# Patient Record
Sex: MALE | Race: WHITE | NOT HISPANIC OR LATINO | Employment: FULL TIME | URBAN - METROPOLITAN AREA
[De-identification: names, ages, dates, MRNs, and addresses within clinical notes are randomized per-mention and may not be internally consistent; named-entity substitution may affect disease eponyms.]

---

## 2017-09-05 ENCOUNTER — OFFICE VISIT (OUTPATIENT)
Dept: CARDIOLOGY | Facility: MEDICAL CENTER | Age: 32
End: 2017-09-05

## 2017-09-05 VITALS
OXYGEN SATURATION: 96 % | BODY MASS INDEX: 31.01 KG/M2 | DIASTOLIC BLOOD PRESSURE: 98 MMHG | WEIGHT: 175 LBS | SYSTOLIC BLOOD PRESSURE: 156 MMHG | HEIGHT: 63 IN | HEART RATE: 94 BPM

## 2017-09-05 DIAGNOSIS — I10 ESSENTIAL HYPERTENSION: ICD-10-CM

## 2017-09-05 DIAGNOSIS — I48.91 ATRIAL FIBRILLATION, UNSPECIFIED TYPE (HCC): ICD-10-CM

## 2017-09-05 LAB — EKG IMPRESSION: NORMAL

## 2017-09-05 PROCEDURE — 99214 OFFICE O/P EST MOD 30 MIN: CPT | Performed by: INTERNAL MEDICINE

## 2017-09-05 PROCEDURE — 93000 ELECTROCARDIOGRAM COMPLETE: CPT | Performed by: INTERNAL MEDICINE

## 2017-09-05 RX ORDER — CARVEDILOL 3.12 MG/1
3.12 TABLET ORAL 2 TIMES DAILY WITH MEALS
Qty: 60 TAB | Refills: 5 | Status: SHIPPED | OUTPATIENT
Start: 2017-09-05 | End: 2018-01-09

## 2017-09-05 RX ORDER — LISINOPRIL 10 MG/1
20 TABLET ORAL DAILY
Qty: 30 TAB | Refills: 5 | Status: ON HOLD | OUTPATIENT
Start: 2017-09-05 | End: 2018-01-11

## 2017-09-05 ASSESSMENT — ENCOUNTER SYMPTOMS
SHORTNESS OF BREATH: 1
PALPITATIONS: 1

## 2017-09-05 NOTE — PROGRESS NOTES
"Subjective:   Elian Sanchez is a 32 y.o. male who presents today for follow up of a fib    Still getting fast hr.  Sob is tied to fast hr.    Lots of a fib.  Happens just about every day.    Pressure in head.  Feels like it is going to explode. Not sure if it is related to fast rhythm.   Gets cramping.  bp consistently elevated.      Past Medical History:   Diagnosis Date   • Breath shortness    • Cardiac rhythm disturbance    • Hemorrhagic disorder (CMS-HCC)     nose bleeds, even before Xarelto   • Hypertension    • Snoring      Past Surgical History:   Procedure Laterality Date   • RECOVERY  1/20/2016    Procedure: CATH LAB A-FIB ABLATION LRG GRP CARTO MARLENY ;  Surgeon: Recoveryonly Surgery;  Location: SURGERY PRE-POST PROC UNIT [facility] [place];  Service:    • OTHER ORTHOPEDIC SURGERY  1993    Pin place to hold joint in place.      History reviewed. No pertinent family history.  History   Smoking Status   • Never Smoker   Smokeless Tobacco   • Never Used     No Known Allergies  Outpatient Encounter Prescriptions as of 9/5/2017   Medication Sig Dispense Refill   • carvedilol (COREG) 3.125 MG Tab Take 1 Tab by mouth 2 times a day, with meals. 60 Tab 5   • lisinopril (PRINIVIL) 10 MG Tab Take 2 Tabs by mouth every day. 30 Tab 5     No facility-administered encounter medications on file as of 9/5/2017.      Review of Systems   Respiratory: Positive for shortness of breath.    Cardiovascular: Positive for palpitations.   Neurological: Positive for headaches.        Objective:   /98   Pulse 94   Ht 1.6 m (5' 3\")   Wt 79.4 kg (175 lb)   SpO2 96%   BMI 31.00 kg/m²     Physical Exam   Constitutional: He is oriented to person, place, and time. He appears well-developed and well-nourished. No distress.   HENT:   Head: Normocephalic and atraumatic.   Right Ear: External ear normal.   Left Ear: External ear normal.   Nose: Nose normal.   Mouth/Throat: Oropharynx is clear and moist.   Eyes: Conjunctivae and EOM are normal. " Pupils are equal, round, and reactive to light. Right eye exhibits no discharge. Left eye exhibits no discharge. No scleral icterus.   Neck: Normal range of motion. Neck supple. No JVD present. No tracheal deviation present.   Cardiovascular: Normal rate, regular rhythm, normal heart sounds and intact distal pulses.  Exam reveals no gallop and no friction rub.    No murmur heard.  Pulmonary/Chest: Effort normal and breath sounds normal. No stridor. No respiratory distress. He has no wheezes. He has no rales. He exhibits no tenderness.   Abdominal: Soft. He exhibits no distension. There is no tenderness.   Musculoskeletal: He exhibits no edema or tenderness.   Neurological: He is alert and oriented to person, place, and time. No cranial nerve deficit. Coordination normal.   Skin: Skin is warm and dry. No rash noted. He is not diaphoretic. No erythema. No pallor.   Psychiatric: He has a normal mood and affect. His behavior is normal. Judgment and thought content normal.   Vitals reviewed.      Assessment:     1. Atrial fibrillation, unspecified type (CMS-HCC)  EKG    HOLTER MONITOR STUDY   2. Essential hypertension  URINALYSIS    COMP METABOLIC PANEL    THYROID PANEL WITH TSH       Medical Decision Making:  Today's Assessment / Status / Plan:   A fib- get monitor for recurrence.  Consider repeat pvi because had initial excellent response  htn- family history but evaluate for secondary htn.  If bp stays elevated would start chlorthalidone and low dose b blocker

## 2017-10-03 ASSESSMENT — ENCOUNTER SYMPTOMS: HEADACHES: 1

## 2017-10-26 ENCOUNTER — TELEPHONE (OUTPATIENT)
Dept: CARDIOLOGY | Facility: MEDICAL CENTER | Age: 32
End: 2017-10-26

## 2017-10-26 NOTE — TELEPHONE ENCOUNTER
L/M patient to have labs done ordered at prior appt. Please call back if done with where it was done-am

## 2017-11-07 ENCOUNTER — HOSPITAL ENCOUNTER (EMERGENCY)
Facility: MEDICAL CENTER | Age: 32
End: 2017-11-07
Attending: EMERGENCY MEDICINE
Payer: MEDICAID

## 2017-11-07 ENCOUNTER — APPOINTMENT (OUTPATIENT)
Dept: RADIOLOGY | Facility: MEDICAL CENTER | Age: 32
End: 2017-11-07
Attending: EMERGENCY MEDICINE
Payer: MEDICAID

## 2017-11-07 VITALS
SYSTOLIC BLOOD PRESSURE: 122 MMHG | TEMPERATURE: 98 F | WEIGHT: 184 LBS | OXYGEN SATURATION: 96 % | HEIGHT: 65 IN | HEART RATE: 60 BPM | RESPIRATION RATE: 18 BRPM | BODY MASS INDEX: 30.66 KG/M2 | DIASTOLIC BLOOD PRESSURE: 84 MMHG

## 2017-11-07 DIAGNOSIS — R19.7 VOMITING AND DIARRHEA: ICD-10-CM

## 2017-11-07 DIAGNOSIS — R11.10 VOMITING AND DIARRHEA: ICD-10-CM

## 2017-11-07 LAB
ALBUMIN SERPL BCP-MCNC: 4.6 G/DL (ref 3.2–4.9)
ALBUMIN/GLOB SERPL: 1.4 G/DL
ALP SERPL-CCNC: 76 U/L (ref 30–99)
ALT SERPL-CCNC: 19 U/L (ref 2–50)
ANION GAP SERPL CALC-SCNC: 7 MMOL/L (ref 0–11.9)
APTT PPP: 29.3 SEC (ref 24.7–36)
AST SERPL-CCNC: 17 U/L (ref 12–45)
BASOPHILS # BLD AUTO: 0.6 % (ref 0–1.8)
BASOPHILS # BLD: 0.06 K/UL (ref 0–0.12)
BILIRUB SERPL-MCNC: 0.7 MG/DL (ref 0.1–1.5)
BNP SERPL-MCNC: 15 PG/ML (ref 0–100)
BUN SERPL-MCNC: 12 MG/DL (ref 8–22)
CALCIUM SERPL-MCNC: 9.6 MG/DL (ref 8.5–10.5)
CHLORIDE SERPL-SCNC: 106 MMOL/L (ref 96–112)
CO2 SERPL-SCNC: 24 MMOL/L (ref 20–33)
CREAT SERPL-MCNC: 0.89 MG/DL (ref 0.5–1.4)
EKG IMPRESSION: NORMAL
EOSINOPHIL # BLD AUTO: 0.27 K/UL (ref 0–0.51)
EOSINOPHIL NFR BLD: 2.9 % (ref 0–6.9)
ERYTHROCYTE [DISTWIDTH] IN BLOOD BY AUTOMATED COUNT: 37.2 FL (ref 35.9–50)
GFR SERPL CREATININE-BSD FRML MDRD: >60 ML/MIN/1.73 M 2
GLOBULIN SER CALC-MCNC: 3.4 G/DL (ref 1.9–3.5)
GLUCOSE SERPL-MCNC: 98 MG/DL (ref 65–99)
HCT VFR BLD AUTO: 50 % (ref 42–52)
HGB BLD-MCNC: 17.4 G/DL (ref 14–18)
IMM GRANULOCYTES # BLD AUTO: 0.07 K/UL (ref 0–0.11)
IMM GRANULOCYTES NFR BLD AUTO: 0.8 % (ref 0–0.9)
INR PPP: 0.95 (ref 0.87–1.13)
LIPASE SERPL-CCNC: 61 U/L (ref 11–82)
LYMPHOCYTES # BLD AUTO: 1.88 K/UL (ref 1–4.8)
LYMPHOCYTES NFR BLD: 20.2 % (ref 22–41)
MAGNESIUM SERPL-MCNC: 2 MG/DL (ref 1.5–2.5)
MCH RBC QN AUTO: 28.2 PG (ref 27–33)
MCHC RBC AUTO-ENTMCNC: 34.8 G/DL (ref 33.7–35.3)
MCV RBC AUTO: 80.9 FL (ref 81.4–97.8)
MONOCYTES # BLD AUTO: 0.48 K/UL (ref 0–0.85)
MONOCYTES NFR BLD AUTO: 5.1 % (ref 0–13.4)
NEUTROPHILS # BLD AUTO: 6.57 K/UL (ref 1.82–7.42)
NEUTROPHILS NFR BLD: 70.4 % (ref 44–72)
NRBC # BLD AUTO: 0 K/UL
NRBC BLD AUTO-RTO: 0 /100 WBC
PLATELET # BLD AUTO: 280 K/UL (ref 164–446)
PMV BLD AUTO: 9.5 FL (ref 9–12.9)
POTASSIUM SERPL-SCNC: 5.2 MMOL/L (ref 3.6–5.5)
PROT SERPL-MCNC: 8 G/DL (ref 6–8.2)
PROTHROMBIN TIME: 12.4 SEC (ref 12–14.6)
RBC # BLD AUTO: 6.18 M/UL (ref 4.7–6.1)
SODIUM SERPL-SCNC: 137 MMOL/L (ref 135–145)
TROPONIN I SERPL-MCNC: <0.01 NG/ML (ref 0–0.04)
TSH SERPL DL<=0.005 MIU/L-ACNC: 2.7 UIU/ML (ref 0.3–3.7)
WBC # BLD AUTO: 9.3 K/UL (ref 4.8–10.8)

## 2017-11-07 PROCEDURE — 93005 ELECTROCARDIOGRAM TRACING: CPT | Performed by: EMERGENCY MEDICINE

## 2017-11-07 PROCEDURE — 99284 EMERGENCY DEPT VISIT MOD MDM: CPT

## 2017-11-07 PROCEDURE — 83690 ASSAY OF LIPASE: CPT

## 2017-11-07 PROCEDURE — 71010 DX-CHEST-LIMITED (1 VIEW): CPT

## 2017-11-07 PROCEDURE — 84443 ASSAY THYROID STIM HORMONE: CPT

## 2017-11-07 PROCEDURE — 96374 THER/PROPH/DIAG INJ IV PUSH: CPT

## 2017-11-07 PROCEDURE — 84484 ASSAY OF TROPONIN QUANT: CPT

## 2017-11-07 PROCEDURE — 85610 PROTHROMBIN TIME: CPT

## 2017-11-07 PROCEDURE — 700111 HCHG RX REV CODE 636 W/ 250 OVERRIDE (IP): Performed by: EMERGENCY MEDICINE

## 2017-11-07 PROCEDURE — 83880 ASSAY OF NATRIURETIC PEPTIDE: CPT

## 2017-11-07 PROCEDURE — 83735 ASSAY OF MAGNESIUM: CPT

## 2017-11-07 PROCEDURE — 93005 ELECTROCARDIOGRAM TRACING: CPT

## 2017-11-07 PROCEDURE — 80053 COMPREHEN METABOLIC PANEL: CPT

## 2017-11-07 PROCEDURE — 85025 COMPLETE CBC W/AUTO DIFF WBC: CPT

## 2017-11-07 PROCEDURE — 85730 THROMBOPLASTIN TIME PARTIAL: CPT

## 2017-11-07 RX ORDER — LORAZEPAM 2 MG/ML
0.5 INJECTION INTRAMUSCULAR ONCE
Status: COMPLETED | OUTPATIENT
Start: 2017-11-07 | End: 2017-11-07

## 2017-11-07 RX ADMIN — LORAZEPAM 0.5 MG: 2 INJECTION INTRAMUSCULAR; INTRAVENOUS at 09:21

## 2017-11-07 ASSESSMENT — PAIN SCALES - GENERAL: PAINLEVEL_OUTOF10: 0

## 2017-11-07 ASSESSMENT — LIFESTYLE VARIABLES: DO YOU DRINK ALCOHOL: NO

## 2017-11-07 NOTE — ED PROVIDER NOTES
ED Provider Note    CHIEF COMPLAINT  Chief Complaint   Patient presents with   • Other     pt has had 2-3 days of sob, weakness and tiredness. pt states he has a hx of red- MD appt tomorrow for abalation consult/labs for kidney fuction. pt states he has not gone to some appts because he doesnt have insurance and cant get in. pt on EMS monitor showed SR to bigeminy 50-60s. .    • N/V     X1 at home and felt better        HPI  Elian Sanchez is a 32 y.o. male who presentsTo the emergency room with weakness, fatigue, nausea, vomiting and diarrhea. He reports a history of atrial fibrillation. He feels like he has atrial fibrillation quite regularly. He's been seen by his cardiologist and is currently on medications for this. He has had an ablation in the past. He has an appointment tomorrow with Dr. Groves. He was told that he needed to have some labs done check his kidney function he states that for at least about last month every single morning he wakes up throws up and has watery diarrhea. He has not seen any blood in the stool. He has on occasion seen blood in his emesis. He denies any associated abdominal pain. Later in the day he will not infrequently have another bowel movement that is of normal consistency. He states that he feels weak and tired. He has not been sleeping well. In the emergency department he feels anxious, but he denies any other symptoms.    REVIEW OF SYSTEMS  As per HPI, otherwise a 10 point review of systems is negative    PAST MEDICAL HISTORY  Past Medical History:   Diagnosis Date   • Breath shortness    • Cardiac rhythm disturbance    • Hemorrhagic disorder (CMS-HCC)     nose bleeds, even before Xarelto   • Hypertension    • Snoring        SOCIAL HISTORY  Social History   Substance Use Topics   • Smoking status: Never Smoker   • Smokeless tobacco: Never Used   • Alcohol use Yes      Comment: occasional       SURGICAL HISTORY  Past Surgical History:   Procedure Laterality Date  "  • RECOVERY  1/20/2016    Procedure: CATH LAB A-FIB ABLATION LRG GRP APARNA MARLENY ;  Surgeon: Recoveryonrichy Surgery;  Location: SURGERY PRE-POST PROC UNIT [facility] [place];  Service:    • OTHER ORTHOPEDIC SURGERY  1993    Pin place to hold joint in place.        CURRENT MEDICATIONS  Home Medications    **Home medications have not yet been reviewed for this encounter**         ALLERGIES  No Known Allergies    PHYSICAL EXAM  VITAL SIGNS: /84   Pulse 64   Temp 36.7 °C (98 °F)   Resp 18   Ht 1.651 m (5' 5\")   Wt 83.5 kg (184 lb)   SpO2 97%   BMI 30.62 kg/m²    Constitutional: Awake and alert  HENT:  Atraumatic, Normocephalic.Oropharynx moist mucus membranes, Nose normal inspection.   Eyes: Normal inspection  Neck: Supple  Cardiovascular: Normal heart rate, Normal rhythm.  Symmetric peripheral pulses.   Thorax & Lungs: No respiratory distress, No wheezing, No rales, No rhonchi, No chest tenderness.   Abdomen: Bowel sounds normal, soft, non-distended, nontender, no mass  Skin: Warm, Dry, No rash.   Back: No tenderness, No CVA tenderness.   Extremities: No clubbing, cyanosis, edema, no Homans or cords   Neurologic: Grossly normal   Psychiatric: Anxious appearing    RADIOLOGY/PROCEDURES  DX-CHEST-LIMITED (1 VIEW)   Final Result      No pulmonary consolidation.        Labs:  Results for orders placed or performed during the hospital encounter of 11/07/17   Troponin   Result Value Ref Range    Troponin I <0.01 0.00 - 0.04 ng/mL   Btype Natriuretic Peptide   Result Value Ref Range    B Natriuretic Peptide 15 0 - 100 pg/mL   CBC with Differential   Result Value Ref Range    WBC 9.3 4.8 - 10.8 K/uL    RBC 6.18 (H) 4.70 - 6.10 M/uL    Hemoglobin 17.4 14.0 - 18.0 g/dL    Hematocrit 50.0 42.0 - 52.0 %    MCV 80.9 (L) 81.4 - 97.8 fL    MCH 28.2 27.0 - 33.0 pg    MCHC 34.8 33.7 - 35.3 g/dL    RDW 37.2 35.9 - 50.0 fL    Platelet Count 280 164 - 446 K/uL    MPV 9.5 9.0 - 12.9 fL    Neutrophils-Polys 70.40 44.00 - 72.00 %    Lymphocytes " 20.20 (L) 22.00 - 41.00 %    Monocytes 5.10 0.00 - 13.40 %    Eosinophils 2.90 0.00 - 6.90 %    Basophils 0.60 0.00 - 1.80 %    Immature Granulocytes 0.80 0.00 - 0.90 %    Nucleated RBC 0.00 /100 WBC    Neutrophils (Absolute) 6.57 1.82 - 7.42 K/uL    Lymphs (Absolute) 1.88 1.00 - 4.80 K/uL    Monos (Absolute) 0.48 0.00 - 0.85 K/uL    Eos (Absolute) 0.27 0.00 - 0.51 K/uL    Baso (Absolute) 0.06 0.00 - 0.12 K/uL    Immature Granulocytes (abs) 0.07 0.00 - 0.11 K/uL    NRBC (Absolute) 0.00 K/uL   Complete Metabolic Panel (CMP)   Result Value Ref Range    Sodium 137 135 - 145 mmol/L    Potassium 5.2 3.6 - 5.5 mmol/L    Chloride 106 96 - 112 mmol/L    Co2 24 20 - 33 mmol/L    Anion Gap 7.0 0.0 - 11.9    Glucose 98 65 - 99 mg/dL    Bun 12 8 - 22 mg/dL    Creatinine 0.89 0.50 - 1.40 mg/dL    Calcium 9.6 8.5 - 10.5 mg/dL    AST(SGOT) 17 12 - 45 U/L    ALT(SGPT) 19 2 - 50 U/L    Alkaline Phosphatase 76 30 - 99 U/L    Total Bilirubin 0.7 0.1 - 1.5 mg/dL    Albumin 4.6 3.2 - 4.9 g/dL    Total Protein 8.0 6.0 - 8.2 g/dL    Globulin 3.4 1.9 - 3.5 g/dL    A-G Ratio 1.4 g/dL   Prothrombin Time   Result Value Ref Range    PT 12.4 12.0 - 14.6 sec    INR 0.95 0.87 - 1.13   APTT   Result Value Ref Range    APTT 29.3 24.7 - 36.0 sec   Lipase   Result Value Ref Range    Lipase 61 11 - 82 U/L   TSH   Result Value Ref Range    TSH 2.700 0.300 - 3.700 uIU/mL   ESTIMATED GFR   Result Value Ref Range    GFR If African American >60 >60 mL/min/1.73 m 2    GFR If Non African American >60 >60 mL/min/1.73 m 2   MAGNESIUM   Result Value Ref Range    Magnesium 2.0 1.5 - 2.5 mg/dL   EKG (ER)   Result Value Ref Range    Report       [facility] Emergency Dept.    Test Date:  2017  Pt Name:    OLESYA TAYLOR                Department: ER  MRN:        [id number]                      Room:       [facility]  Gender:     M                            Technician: [id number]  :        1985                   Requested By:ER TRIAGE  PROTOCOL  Order #:    [id number]                    Reading MD: TAMIA ALMEIDA MD    Measurements  Intervals                                Axis  Rate:       57                           P:          10  MA:         144                          QRS:        72  QRSD:       90                           T:          50  QT:         404  QTc:        394    Interpretive Statements  SINUS ARRHYTHMIA  Compared to ECG 09/05/2017 15:07:57      Electronically Signed On 11-7-2017 10:34:00 PST by TAMIA ALMEIDA MD           COURSE & MEDICAL DECISION MAKING  She presents with persistent chronic vomiting and diarrhea. He has a benign exam. His vital signs were normal. He was very anxious he was given Ativan intravenously in the ER and felt better. I obtained laboratory data which was reassuring. At this point because of his chronic vomiting and diarrhea is not clear. He does not have a surgical process or any emergency condition at this time. He has an appointment with Dr. Groves tomorrow for his palpitations. His EKG shows sinus arrhythmia at this time. Would like the patient to follow up with gastroenterology and I given the number for GI consultants who are on-call today. He is also advised that it would be quite helpful if he has a primary provider. [facility]. He will continue with planned follow-up with Dr. Groves tomorrow. I've advised him to return to the ER for any abdominal pain, fevers, worsening symptoms, not improving or concern.    Patient referred to primary provider for blood pressure management    FINAL IMPRESSION  1. Vomiting and diarrhea, chronic  2. Palpitations  3. Anxiety      This dictation was created using voice recognition software. The accuracy of the dictation is limited to the abilities of the software.  The nursing notes were reviewed and certain aspects of this information were incorporated into this note.      Electronically signed by: Tamia Almeida, 11/7/2017 10:54 AM

## 2017-11-07 NOTE — ED NOTES
Chief Complaint   Patient presents with   • Atrial Fibrillation     pt has had 2-3 days of sob, weakness and tiredness. pt states he has a hx of a-felicity- MD appt tomorrow for abalation consult/labs for kidney fuction. pt states he has not gone to some appts because he doesnt have insurance and cant get in. pt on EMS monitor showed SR to bigeminy 50-60s. .    • N/V     X1 at home and felt better

## 2017-11-07 NOTE — ED NOTES
Pt arrived in room and is changed into gown.  Pt resting comfortably on gurney.   Call light within reach and visible from nurses station.

## 2017-11-07 NOTE — DISCHARGE INSTRUCTIONS
Diet for Irritable Bowel Syndrome  When you have irritable bowel syndrome (IBS), the foods you eat and your eating habits are very important. IBS may cause various symptoms, such as abdominal pain, constipation, or diarrhea. Choosing the right foods can help ease discomfort caused by these symptoms. Work with your health care provider and dietitian to find the best eating plan to help control your symptoms.  WHAT GENERAL GUIDELINES DO I NEED TO FOLLOW?  · Keep a food diary. This will help you identify foods that cause symptoms. Write down:  ¨ What you eat and when.  ¨ What symptoms you have.  ¨ When symptoms occur in relation to your meals.  · Avoid foods that cause symptoms. Talk with your dietitian about other ways to get the same nutrients that are in these foods.  · Eat more foods that contain fiber. Take a fiber supplement if directed by your dietitian.  · Eat your meals slowly, in a relaxed setting.  · Aim to eat 5-6 small meals per day. Do not skip meals.  · Drink enough fluids to keep your urine clear or pale yellow.  · Ask your health care provider if you should take an over-the-counter probiotic during flare-ups to help restore healthy gut bacteria.  · If you have cramping or diarrhea, try making your meals low in fat and high in carbohydrates. Examples of carbohydrates are pasta, rice, whole grain breads and cereals, fruits, and vegetables.  · If dairy products cause your symptoms to flare up, try eating less of them. You might be able to handle yogurt better than other dairy products because it contains bacteria that help with digestion.  WHAT FOODS ARE NOT RECOMMENDED?  The following are some foods and drinks that may worsen your symptoms:  · Fatty foods, such as French fries.  · Milk products, such as cheese or ice cream.  · Chocolate.  · Alcohol.  · Products with caffeine, such as coffee.  · Carbonated drinks, such as soda.  The items listed above may not be a complete list of foods and beverages to  avoid. Contact your dietitian for more information.  WHAT FOODS ARE GOOD SOURCES OF FIBER?  Your health care provider or dietitian may recommend that you eat more foods that contain fiber. Fiber can help reduce constipation and other IBS symptoms. Add foods with fiber to your diet a little at a time so that your body can get used to them. Too much fiber at once might cause gas and swelling of your abdomen. The following are some foods that are good sources of fiber:  · Apples.  · Peaches.  · Pears.  · Berries.  · Figs.  · Broccoli (raw).  · Cabbage.  · Carrots.  · Raw peas.  · Kidney beans.  · Lima beans.  · Whole grain bread.  · Whole grain cereal.  FOR MORE INFORMATION   International Foundation for Functional Gastrointestinal Disorders: www.iffgd.org  National [place] of Diabetes and Digestive and Kidney Diseases: www.niddk.nih.gov/health-information/health-topics/digestive-diseases/ibs/Pages/facts.aspx     This information is not intended to replace advice given to you by your health care provider. Make sure you discuss any questions you have with your health care provider.     Document Released: 03/09/2005 Document Revised: 01/08/2016 Document Reviewed: 03/20/2015  [employer] Interactive Patient Education ©2016 [employer]

## 2017-11-08 ENCOUNTER — OFFICE VISIT (OUTPATIENT)
Dept: CARDIOLOGY | Facility: MEDICAL CENTER | Age: 32
End: 2017-11-08
Payer: MEDICAID

## 2017-11-08 VITALS
HEIGHT: 63 IN | BODY MASS INDEX: 30.83 KG/M2 | SYSTOLIC BLOOD PRESSURE: 122 MMHG | OXYGEN SATURATION: 98 % | DIASTOLIC BLOOD PRESSURE: 74 MMHG | HEART RATE: 78 BPM | WEIGHT: 174 LBS

## 2017-11-08 DIAGNOSIS — I48.0 PAF (PAROXYSMAL ATRIAL FIBRILLATION) (HCC): ICD-10-CM

## 2017-11-08 LAB — EKG IMPRESSION: NORMAL

## 2017-11-08 PROCEDURE — 93000 ELECTROCARDIOGRAM COMPLETE: CPT | Performed by: INTERNAL MEDICINE

## 2017-11-08 PROCEDURE — 99214 OFFICE O/P EST MOD 30 MIN: CPT | Performed by: INTERNAL MEDICINE

## 2017-11-08 RX ORDER — FLECAINIDE ACETATE 100 MG/1
100 TABLET ORAL 2 TIMES DAILY
Qty: 60 TAB | Refills: 11 | Status: ON HOLD | OUTPATIENT
Start: 2017-11-08 | End: 2018-01-11

## 2017-11-09 NOTE — PROGRESS NOTES
"Subjective:   Elian Sanchez is a 32 y.o. male who presents today for follow up of a fib    Gets tired for days when it happens.  Happens frequently enough that it is keeping him from being able to hold down a job    Past Medical History:   Diagnosis Date   • Breath shortness    • Cardiac rhythm disturbance    • Hemorrhagic disorder (CMS-HCC)     nose bleeds, even before Xarelto   • Hypertension    • Snoring      Past Surgical History:   Procedure Laterality Date   • RECOVERY  1/20/2016    Procedure: CATH LAB A-FIB ABLATION LRG KARLIE FARMER ;  Surgeon: Recoveryonly Surgery;  Location: SURGERY PRE-POST PROC UNIT [facility] [place];  Service:    • OTHER ORTHOPEDIC SURGERY  1993    Pin place to hold joint in place.      History reviewed. No pertinent family history.  History   Smoking Status   • Never Smoker   Smokeless Tobacco   • Never Used     No Known Allergies  Outpatient Encounter Prescriptions as of 11/8/2017   Medication Sig Dispense Refill   • flecainide (TAMBOCOR) 100 MG Tab Take 1 Tab by mouth 2 times a day. 60 Tab 11   • carvedilol (COREG) 3.125 MG Tab Take 1 Tab by mouth 2 times a day, with meals. 60 Tab 5   • lisinopril (PRINIVIL) 10 MG Tab Take 2 Tabs by mouth every day. 30 Tab 5     No facility-administered encounter medications on file as of 11/8/2017.      Review of Systems   Constitutional: Positive for malaise/fatigue.   Cardiovascular: Positive for palpitations.   Neurological: Positive for dizziness.        Objective:   /74   Pulse 78   Ht 1.6 m (5' 2.99\")   Wt 78.9 kg (174 lb)   SpO2 98%   BMI 30.83 kg/m²     Physical Exam   Constitutional: He is oriented to person, place, and time. He appears well-developed and well-nourished. No distress.   HENT:   Head: Normocephalic and atraumatic.   Right Ear: External ear normal.   Left Ear: External ear normal.   Nose: Nose normal.   Mouth/Throat: Oropharynx is clear and moist.   Eyes: Conjunctivae and EOM are normal. Pupils are equal, round, and reactive " to light. Right eye exhibits no discharge. Left eye exhibits no discharge. No scleral icterus.   Neck: Normal range of motion. Neck supple. No JVD present. No tracheal deviation present.   Cardiovascular: Normal rate, regular rhythm, normal heart sounds and intact distal pulses.  Exam reveals no gallop and no friction rub.    No murmur heard.  Pulmonary/Chest: Effort normal and breath sounds normal. No stridor. No respiratory distress. He has no wheezes. He has no rales. He exhibits no tenderness.   Abdominal: Soft. He exhibits no distension. There is no tenderness.   Musculoskeletal: He exhibits no edema or tenderness.   Neurological: He is alert and oriented to person, place, and time. No cranial nerve deficit. Coordination normal.   Skin: Skin is warm and dry. No rash noted. He is not diaphoretic. No erythema. No pallor.   Psychiatric: He has a normal mood and affect. His behavior is normal. Judgment and thought content normal.   Vitals reviewed.      Assessment:     1. PAF (paroxysmal atrial fibrillation) (CMS-[facility])  EKG       Medical Decision Making:  Today's Assessment / Status / Plan:   Continue current regimen for now.  Offered repeat eps and ablation to try to get better control but has some financial issues limiting his ability to to that now.  Will follow closely and try to get him in for procedure when he is able.

## 2017-11-19 ASSESSMENT — ENCOUNTER SYMPTOMS
PALPITATIONS: 1
DIZZINESS: 1

## 2017-12-14 ENCOUNTER — TELEPHONE (OUTPATIENT)
Dept: CARDIOLOGY | Facility: MEDICAL CENTER | Age: 32
End: 2017-12-14

## 2017-12-14 NOTE — TELEPHONE ENCOUNTER
Dr. Groves,    This patient is scheduled for an afib ablation on 1-10-18 at [facility] with you. Would you like me to schedule a HUGH for this case? He is not on any anticoagulants. Also, are there any medications you would like this patient to hold for this procedure?    Please advise    Thank You,  Gin

## 2018-01-09 DIAGNOSIS — Z01.812 PRE-OPERATIVE LABORATORY EXAMINATION: ICD-10-CM

## 2018-01-09 DIAGNOSIS — Z01.810 PRE-OPERATIVE CARDIOVASCULAR EXAMINATION: ICD-10-CM

## 2018-01-09 LAB
ALBUMIN SERPL BCP-MCNC: 4.7 G/DL (ref 3.2–4.9)
ALBUMIN/GLOB SERPL: 1.7 G/DL
ALP SERPL-CCNC: 50 U/L (ref 30–99)
ALT SERPL-CCNC: 35 U/L (ref 2–50)
ANION GAP SERPL CALC-SCNC: 6 MMOL/L (ref 0–11.9)
AST SERPL-CCNC: 19 U/L (ref 12–45)
BASOPHILS # BLD AUTO: 0.5 % (ref 0–1.8)
BASOPHILS # BLD: 0.05 K/UL (ref 0–0.12)
BILIRUB SERPL-MCNC: 0.6 MG/DL (ref 0.1–1.5)
BUN SERPL-MCNC: 14 MG/DL (ref 8–22)
CALCIUM SERPL-MCNC: 9.5 MG/DL (ref 8.5–10.5)
CHLORIDE SERPL-SCNC: 107 MMOL/L (ref 96–112)
CO2 SERPL-SCNC: 28 MMOL/L (ref 20–33)
CREAT SERPL-MCNC: 0.96 MG/DL (ref 0.5–1.4)
EOSINOPHIL # BLD AUTO: 0.23 K/UL (ref 0–0.51)
EOSINOPHIL NFR BLD: 2.2 % (ref 0–6.9)
ERYTHROCYTE [DISTWIDTH] IN BLOOD BY AUTOMATED COUNT: 37.1 FL (ref 35.9–50)
GLOBULIN SER CALC-MCNC: 2.7 G/DL (ref 1.9–3.5)
GLUCOSE SERPL-MCNC: 77 MG/DL (ref 65–99)
HCT VFR BLD AUTO: 44.2 % (ref 42–52)
HGB BLD-MCNC: 15.3 G/DL (ref 14–18)
IMM GRANULOCYTES # BLD AUTO: 0.04 K/UL (ref 0–0.11)
IMM GRANULOCYTES NFR BLD AUTO: 0.4 % (ref 0–0.9)
INR PPP: 1.06 (ref 0.87–1.13)
LYMPHOCYTES # BLD AUTO: 1.76 K/UL (ref 1–4.8)
LYMPHOCYTES NFR BLD: 16.8 % (ref 22–41)
MCH RBC QN AUTO: 28.1 PG (ref 27–33)
MCHC RBC AUTO-ENTMCNC: 34.6 G/DL (ref 33.7–35.3)
MCV RBC AUTO: 81.3 FL (ref 81.4–97.8)
MONOCYTES # BLD AUTO: 0.34 K/UL (ref 0–0.85)
MONOCYTES NFR BLD AUTO: 3.2 % (ref 0–13.4)
NEUTROPHILS # BLD AUTO: 8.05 K/UL (ref 1.82–7.42)
NEUTROPHILS NFR BLD: 76.9 % (ref 44–72)
NRBC # BLD AUTO: 0 K/UL
NRBC BLD-RTO: 0 /100 WBC
PLATELET # BLD AUTO: 245 K/UL (ref 164–446)
PMV BLD AUTO: 9.6 FL (ref 9–12.9)
POTASSIUM SERPL-SCNC: 3.5 MMOL/L (ref 3.6–5.5)
PROT SERPL-MCNC: 7.4 G/DL (ref 6–8.2)
PROTHROMBIN TIME: 13.5 SEC (ref 12–14.6)
RBC # BLD AUTO: 5.44 M/UL (ref 4.7–6.1)
SODIUM SERPL-SCNC: 141 MMOL/L (ref 135–145)
WBC # BLD AUTO: 10.5 K/UL (ref 4.8–10.8)

## 2018-01-09 PROCEDURE — 80053 COMPREHEN METABOLIC PANEL: CPT

## 2018-01-09 PROCEDURE — 93005 ELECTROCARDIOGRAM TRACING: CPT

## 2018-01-09 PROCEDURE — 85025 COMPLETE CBC W/AUTO DIFF WBC: CPT

## 2018-01-09 PROCEDURE — 93010 ELECTROCARDIOGRAM REPORT: CPT | Performed by: INTERNAL MEDICINE

## 2018-01-09 PROCEDURE — 36415 COLL VENOUS BLD VENIPUNCTURE: CPT

## 2018-01-09 PROCEDURE — 85610 PROTHROMBIN TIME: CPT

## 2018-01-09 RX ORDER — HYDROCODONE BITARTRATE AND ACETAMINOPHEN 5; 325 MG/1; MG/1
1-2 TABLET ORAL EVERY 4 HOURS PRN
COMMUNITY
End: 2018-01-12

## 2018-01-09 RX ORDER — ACETAMINOPHEN 325 MG/1
650 TABLET ORAL EVERY 4 HOURS PRN
COMMUNITY
End: 2018-01-14

## 2018-01-09 RX ORDER — IBUPROFEN 200 MG
200 TABLET ORAL EVERY 6 HOURS PRN
Status: ON HOLD | COMMUNITY
End: 2018-01-11

## 2018-01-10 ENCOUNTER — HOSPITAL ENCOUNTER (OUTPATIENT)
Facility: MEDICAL CENTER | Age: 33
End: 2018-01-11
Attending: INTERNAL MEDICINE | Admitting: INTERNAL MEDICINE
Payer: COMMERCIAL

## 2018-01-10 LAB
ACT BLD: 235 SEC (ref 74–137)
ACT BLD: 252 SEC (ref 74–137)
EKG IMPRESSION: NORMAL
EKG IMPRESSION: NORMAL

## 2018-01-10 PROCEDURE — C1894 INTRO/SHEATH, NON-LASER: HCPCS

## 2018-01-10 PROCEDURE — C1732 CATH, EP, DIAG/ABL, 3D/VECT: HCPCS

## 2018-01-10 PROCEDURE — 93623 PRGRMD STIMJ&PACG IV RX NFS: CPT

## 2018-01-10 PROCEDURE — 160002 HCHG RECOVERY MINUTES (STAT)

## 2018-01-10 PROCEDURE — 360995 HCHG BAYLIS TRANSSEPTAL NEEDLE

## 2018-01-10 PROCEDURE — 360980 HCHG SINGLE TRANSDUCER

## 2018-01-10 PROCEDURE — 305387 HCHG SUTURES

## 2018-01-10 PROCEDURE — 700111 HCHG RX REV CODE 636 W/ 250 OVERRIDE (IP)

## 2018-01-10 PROCEDURE — 93662 INTRACARDIAC ECG (ICE): CPT

## 2018-01-10 PROCEDURE — 305383 HCHG CARTO3 REF PATCH

## 2018-01-10 PROCEDURE — C1731 CATH, EP, 20 OR MORE ELEC: HCPCS

## 2018-01-10 PROCEDURE — C1893 INTRO/SHEATH, FIXED,NON-PEEL: HCPCS

## 2018-01-10 PROCEDURE — 93613 INTRACARDIAC EPHYS 3D MAPG: CPT

## 2018-01-10 PROCEDURE — 305545 HCHG DOUBLE TRANSDUCER

## 2018-01-10 PROCEDURE — 700102 HCHG RX REV CODE 250 W/ 637 OVERRIDE(OP): Performed by: INTERNAL MEDICINE

## 2018-01-10 PROCEDURE — G0378 HOSPITAL OBSERVATION PER HR: HCPCS

## 2018-01-10 PROCEDURE — 00537 ANES CARDIAC EP PROCEDURES: CPT

## 2018-01-10 PROCEDURE — 93655 ICAR CATH ABLTJ DSCRT ARRHYT: CPT

## 2018-01-10 PROCEDURE — 93010 ELECTROCARDIOGRAM REPORT: CPT | Performed by: INTERNAL MEDICINE

## 2018-01-10 PROCEDURE — C1759 CATH, INTRA ECHOCARDIOGRAPHY: HCPCS

## 2018-01-10 PROCEDURE — 93656 COMPRE EP EVAL ABLTJ ATR FIB: CPT

## 2018-01-10 PROCEDURE — 700101 HCHG RX REV CODE 250

## 2018-01-10 PROCEDURE — 85347 COAGULATION TIME ACTIVATED: CPT | Mod: 91

## 2018-01-10 PROCEDURE — 93621 COMP EP EVL L PAC&REC C SINS: CPT

## 2018-01-10 PROCEDURE — 304952 HCHG R 2 PADS

## 2018-01-10 PROCEDURE — A9270 NON-COVERED ITEM OR SERVICE: HCPCS | Performed by: INTERNAL MEDICINE

## 2018-01-10 PROCEDURE — 93005 ELECTROCARDIOGRAM TRACING: CPT | Performed by: INTERNAL MEDICINE

## 2018-01-10 RX ORDER — ISOPROTERENOL HYDROCHLORIDE 0.2 MG/ML
INJECTION, SOLUTION INTRAVENOUS
Status: COMPLETED
Start: 2018-01-10 | End: 2018-01-10

## 2018-01-10 RX ORDER — SODIUM CHLORIDE, SODIUM LACTATE, POTASSIUM CHLORIDE, CALCIUM CHLORIDE 600; 310; 30; 20 MG/100ML; MG/100ML; MG/100ML; MG/100ML
INJECTION, SOLUTION INTRAVENOUS CONTINUOUS
Status: DISCONTINUED | OUTPATIENT
Start: 2018-01-10 | End: 2018-01-11 | Stop reason: HOSPADM

## 2018-01-10 RX ORDER — ONDANSETRON 2 MG/ML
4 INJECTION INTRAMUSCULAR; INTRAVENOUS EVERY 6 HOURS PRN
Status: DISCONTINUED | OUTPATIENT
Start: 2018-01-10 | End: 2018-01-11 | Stop reason: HOSPADM

## 2018-01-10 RX ORDER — OXYCODONE HYDROCHLORIDE 5 MG/1
2.5 TABLET ORAL
Status: DISCONTINUED | OUTPATIENT
Start: 2018-01-10 | End: 2018-01-11 | Stop reason: HOSPADM

## 2018-01-10 RX ORDER — LIDOCAINE HYDROCHLORIDE 20 MG/ML
INJECTION, SOLUTION INFILTRATION; PERINEURAL
Status: COMPLETED
Start: 2018-01-10 | End: 2018-01-10

## 2018-01-10 RX ORDER — PROTAMINE SULFATE 10 MG/ML
INJECTION, SOLUTION INTRAVENOUS
Status: COMPLETED
Start: 2018-01-10 | End: 2018-01-10

## 2018-01-10 RX ORDER — ADENOSINE 3 MG/ML
INJECTION, SOLUTION INTRAVENOUS
Status: COMPLETED
Start: 2018-01-10 | End: 2018-01-10

## 2018-01-10 RX ORDER — HEPARIN SODIUM,PORCINE 1000/ML
VIAL (ML) INJECTION
Status: COMPLETED
Start: 2018-01-10 | End: 2018-01-10

## 2018-01-10 RX ADMIN — OXYCODONE HYDROCHLORIDE 2.5 MG: 5 TABLET ORAL at 20:26

## 2018-01-10 RX ADMIN — HEPARIN SODIUM 25000 UNITS: 5000 INJECTION, SOLUTION INTRAVENOUS at 15:03

## 2018-01-10 RX ADMIN — ISOPROTERENOL HYDROCHLORIDE 200 MCG: 0.2 INJECTION, SOLUTION INTRACARDIAC; INTRAMUSCULAR; INTRAVENOUS; SUBCUTANEOUS at 15:28

## 2018-01-10 RX ADMIN — ADENOSINE 12 MG: 3 INJECTION, SOLUTION INTRAVENOUS at 15:28

## 2018-01-10 RX ADMIN — LIDOCAINE HYDROCHLORIDE: 20 INJECTION, SOLUTION INFILTRATION; PERINEURAL at 14:36

## 2018-01-10 RX ADMIN — PROTAMINE SULFATE 30 MG: 10 INJECTION, SOLUTION INTRAVENOUS at 15:45

## 2018-01-10 RX ADMIN — HEPARIN SODIUM: 1000 INJECTION, SOLUTION INTRAVENOUS; SUBCUTANEOUS at 14:37

## 2018-01-10 RX ADMIN — APIXABAN 5 MG: 5 TABLET, FILM COATED ORAL at 20:26

## 2018-01-10 RX ADMIN — HEPARIN SODIUM 2000 UNITS: 200 INJECTION, SOLUTION INTRAVENOUS at 14:37

## 2018-01-10 ASSESSMENT — PAIN SCALES - GENERAL
PAINLEVEL_OUTOF10: 0
PAINLEVEL_OUTOF10: 7
PAINLEVEL_OUTOF10: 0

## 2018-01-10 ASSESSMENT — PATIENT HEALTH QUESTIONNAIRE - PHQ9
1. LITTLE INTEREST OR PLEASURE IN DOING THINGS: NOT AT ALL
SUM OF ALL RESPONSES TO PHQ QUESTIONS 1-9: 0
2. FEELING DOWN, DEPRESSED, IRRITABLE, OR HOPELESS: NOT AT ALL
SUM OF ALL RESPONSES TO PHQ9 QUESTIONS 1 AND 2: 0

## 2018-01-10 ASSESSMENT — LIFESTYLE VARIABLES
AVERAGE NUMBER OF DAYS PER WEEK YOU HAVE A DRINK CONTAINING ALCOHOL: 1
EVER HAD A DRINK FIRST THING IN THE MORNING TO STEADY YOUR NERVES TO GET RID OF A HANGOVER: NO
HAVE YOU EVER FELT YOU SHOULD CUT DOWN ON YOUR DRINKING: NO
EVER FELT BAD OR GUILTY ABOUT YOUR DRINKING: NO
TOTAL SCORE: 0
ALCOHOL_USE: YES
TOTAL SCORE: 0
EVER_SMOKED: NEVER
HOW MANY TIMES IN THE PAST YEAR HAVE YOU HAD 5 OR MORE DRINKS IN A DAY: 0
TOTAL SCORE: 0
ON A TYPICAL DAY WHEN YOU DRINK ALCOHOL HOW MANY DRINKS DO YOU HAVE: 2
CONSUMPTION TOTAL: NEGATIVE
HAVE PEOPLE ANNOYED YOU BY CRITICIZING YOUR DRINKING: NO

## 2018-01-10 ASSESSMENT — ENCOUNTER SYMPTOMS: PALPITATIONS: 1

## 2018-01-10 NOTE — OR SURGEON
Immediate Post-Operative Note      PreOp Diagnosis: a fib    PostOp Diagnosis: same    Procedure(s) :  eps and ablation    Surgeon(s):  Pierre Groves M.D.    Type of Anesthesia: ga    Specimen: None    Estimated Blood Loss: 20 cc's    Contrast Media:  0 cc's    Fluoro Time: <5 min        Findings: rspv reconnected and was reisolated  cti line  No reconnection of rspv with adenosine  Rate apc with high does isuprel    Complications: none apparent      Pierre Groves M.D.  1/10/2018 3:55 PM

## 2018-01-10 NOTE — H&P
"Physician H&P    Patient ID:  Elian Sanchez  [id number]  32 y.o. male  1985    History:  Primary Diagnosis: recurrent a fib    HPI:  31 yo well known to me from clinic with a fib  S/p ablation Jan 2016 with good improvement but been recurring.  Returning for repeat pvi.      Past Medical History:  has a past medical history of Arrhythmia; Bowel habit changes; Breath shortness; Cardiac rhythm disturbance; Dental disorder; Hemorrhagic disorder (CMS-HCC); Hypertension; Pneumonia (2016); and Snoring.  Past Surgical History:  has a past surgical history that includes other orthopedic surgery (1993); recovery (1/20/2016); and other cardiac surgery (01/16/2016).  Past Social History:  reports that he has never smoked. He has never used smokeless tobacco. He reports that he drinks alcohol. He reports that he does not use drugs.  Past Family History: History reviewed. No pertinent family history.  Allergies: Patient has no known allergies.    Current Medications:  Prior to Admission medications    Medication Sig Start Date End Date Taking? Authorizing Provider   hydrocodone-acetaminophen (NORCO) 5-325 MG Tab per tablet Take 1-2 Tabs by mouth every four hours as needed.   Yes Physician Outpatient   ibuprofen (MOTRIN) 200 MG Tab Take 200 mg by mouth every 6 hours as needed.   Yes Physician Outpatient   acetaminophen (TYLENOL) 325 MG Tab Take 650 mg by mouth every four hours as needed.   Yes Physician Outpatient   flecainide (TAMBOCOR) 100 MG Tab Take 1 Tab by mouth 2 times a day. 11/8/17   Pierre Groves M.D.   lisinopril (PRINIVIL) 10 MG Tab Take 2 Tabs by mouth every day. 9/5/17   Pierre Groves M.D.       Review of Systems:  Review of Systems   Cardiovascular: Positive for palpitations.     Height 1.676 m (5' 6\"), weight 77.1 kg (170 lb), SpO2 97 %.    Physical Examination:  Physical Exam   Constitutional: He is oriented to person, place, and time. He appears well-developed and well-nourished. No distress.   HENT: "   Head: Normocephalic and atraumatic.   Right Ear: External ear normal.   Left Ear: External ear normal.   Nose: Nose normal.   Mouth/Throat: Oropharynx is clear and moist.   Eyes: Conjunctivae and EOM are normal. Pupils are equal, round, and reactive to light. Right eye exhibits no discharge. Left eye exhibits no discharge. No scleral icterus.   Neck: Normal range of motion. Neck supple. No JVD present. No tracheal deviation present.   Cardiovascular: Normal rate, regular rhythm, normal heart sounds and intact distal pulses.  Exam reveals no gallop and no friction rub.    No murmur heard.  Pulmonary/Chest: Effort normal and breath sounds normal. No stridor. No respiratory distress. He has no wheezes. He has no rales. He exhibits no tenderness.   Abdominal: Soft. He exhibits no distension. There is no tenderness.   Musculoskeletal: He exhibits no edema or tenderness.   Neurological: He is alert and oriented to person, place, and time. No cranial nerve deficit. Coordination normal.   Skin: Skin is warm and dry. No rash noted. He is not diaphoretic. No erythema. No pallor.   Psychiatric: He has a normal mood and affect. His behavior is normal. Judgment and thought content normal.   Vitals reviewed.      Impression:  Paf recruurnet after ablation    Plan:  eps and ablation.  The risks, benefits, and alternatives to a fib ablation were discussed in great detail. Specific risks mentioned include bleeding, infection, arteriovenous fistula related to sheath placement, cardiac perforation with possible tamponade requiring pericardiocentesis or possibly open heart surgery. In addition, pneumothorax and hemothorax were mentioned with right internal jugular venous access. I discussed the remote possibility of permanent pacemaker placement due to to inadvertent AV block. Lastly the risk of stroke was mentioned; in addition the risk of death and myocardial infarction were discussed. The patient vebalized understanding of these  potential complications and wishes to proceed with the procedure.     Pre Procedure Assessment:  Pre-Procedure Assessment - Applicable for patients receiving sedation by non-anesthesia practitioner.  Previous drug history, other anesthetic experiences, potential anesthetic problems and choice of anesthesia assessed, discussed with patient.     No prior complications.  Results of relevant diagnostic studies reviewed.              Pre Procedure update:   No changes.    Pierre Groves  1/10/2018

## 2018-01-11 ENCOUNTER — TELEPHONE (OUTPATIENT)
Dept: CARDIOLOGY | Facility: MEDICAL CENTER | Age: 33
End: 2018-01-11

## 2018-01-11 VITALS
TEMPERATURE: 97.5 F | RESPIRATION RATE: 17 BRPM | HEIGHT: 66 IN | HEART RATE: 88 BPM | WEIGHT: 170 LBS | DIASTOLIC BLOOD PRESSURE: 92 MMHG | SYSTOLIC BLOOD PRESSURE: 128 MMHG | OXYGEN SATURATION: 95 % | BODY MASS INDEX: 27.32 KG/M2

## 2018-01-11 LAB — EKG IMPRESSION: NORMAL

## 2018-01-11 PROCEDURE — 700102 HCHG RX REV CODE 250 W/ 637 OVERRIDE(OP): Performed by: NURSE PRACTITIONER

## 2018-01-11 PROCEDURE — 700102 HCHG RX REV CODE 250 W/ 637 OVERRIDE(OP): Performed by: INTERNAL MEDICINE

## 2018-01-11 PROCEDURE — 90686 IIV4 VACC NO PRSV 0.5 ML IM: CPT | Performed by: INTERNAL MEDICINE

## 2018-01-11 PROCEDURE — A9270 NON-COVERED ITEM OR SERVICE: HCPCS | Performed by: INTERNAL MEDICINE

## 2018-01-11 PROCEDURE — 93010 ELECTROCARDIOGRAM REPORT: CPT | Performed by: INTERNAL MEDICINE

## 2018-01-11 PROCEDURE — 93005 ELECTROCARDIOGRAM TRACING: CPT | Performed by: INTERNAL MEDICINE

## 2018-01-11 PROCEDURE — G0378 HOSPITAL OBSERVATION PER HR: HCPCS

## 2018-01-11 PROCEDURE — 700111 HCHG RX REV CODE 636 W/ 250 OVERRIDE (IP): Performed by: INTERNAL MEDICINE

## 2018-01-11 PROCEDURE — A9270 NON-COVERED ITEM OR SERVICE: HCPCS | Performed by: NURSE PRACTITIONER

## 2018-01-11 PROCEDURE — 90471 IMMUNIZATION ADMIN: CPT

## 2018-01-11 RX ORDER — LISINOPRIL 5 MG/1
5 TABLET ORAL DAILY
Qty: 30 TAB | Refills: 3 | Status: SHIPPED | OUTPATIENT
Start: 2018-01-11 | End: 2018-01-12

## 2018-01-11 RX ORDER — AMLODIPINE BESYLATE 5 MG/1
2.5 TABLET ORAL
Status: DISCONTINUED | OUTPATIENT
Start: 2018-01-11 | End: 2018-01-11

## 2018-01-11 RX ORDER — LISINOPRIL 5 MG/1
5 TABLET ORAL DAILY
Qty: 30 TAB | Refills: 3 | Status: SHIPPED | OUTPATIENT
Start: 2018-01-11 | End: 2018-01-11

## 2018-01-11 RX ORDER — LISINOPRIL 5 MG/1
5 TABLET ORAL
Status: DISCONTINUED | OUTPATIENT
Start: 2018-01-11 | End: 2018-01-11 | Stop reason: HOSPADM

## 2018-01-11 RX ADMIN — APIXABAN 5 MG: 5 TABLET, FILM COATED ORAL at 08:26

## 2018-01-11 RX ADMIN — INFLUENZA A VIRUS A/MICHIGAN/45/2015 X-275 (H1N1) ANTIGEN (FORMALDEHYDE INACTIVATED), INFLUENZA A VIRUS A/HONG KONG/4801/2014 X-263B (H3N2) ANTIGEN (FORMALDEHYDE INACTIVATED), INFLUENZA B VIRUS B/PHUKET/3073/2013 ANTIGEN (FORMALDEHYDE INACTIVATED), AND INFLUENZA B VIRUS B/BRISBANE/60/2008 ANTIGEN (FORMALDEHYDE INACTIVATED) 0.5 ML: 15; 15; 15; 15 INJECTION, SUSPENSION INTRAMUSCULAR at 05:09

## 2018-01-11 RX ADMIN — LISINOPRIL 5 MG: 5 TABLET ORAL at 08:53

## 2018-01-11 ASSESSMENT — LIFESTYLE VARIABLES
ON A TYPICAL DAY WHEN YOU DRINK ALCOHOL HOW MANY DRINKS DO YOU HAVE: 2
TOTAL SCORE: 0
EVER HAD A DRINK FIRST THING IN THE MORNING TO STEADY YOUR NERVES TO GET RID OF A HANGOVER: NO
TOTAL SCORE: 0
DO YOU DRINK ALCOHOL: YES
CONSUMPTION TOTAL: NEGATIVE
EVER FELT BAD OR GUILTY ABOUT YOUR DRINKING: NO
TOTAL SCORE: 0
HOW MANY TIMES IN THE PAST YEAR HAVE YOU HAD 5 OR MORE DRINKS IN A DAY: 0
HAVE YOU EVER FELT YOU SHOULD CUT DOWN ON YOUR DRINKING: NO
AVERAGE NUMBER OF DAYS PER WEEK YOU HAVE A DRINK CONTAINING ALCOHOL: 1
HAVE PEOPLE ANNOYED YOU BY CRITICIZING YOUR DRINKING: NO

## 2018-01-11 ASSESSMENT — PAIN SCALES - GENERAL
PAINLEVEL_OUTOF10: 0
PAINLEVEL_OUTOF10: 0

## 2018-01-11 ASSESSMENT — PATIENT HEALTH QUESTIONNAIRE - PHQ9
2. FEELING DOWN, DEPRESSED, IRRITABLE, OR HOPELESS: NOT AT ALL
SUM OF ALL RESPONSES TO PHQ QUESTIONS 1-9: 0
1. LITTLE INTEREST OR PLEASURE IN DOING THINGS: NOT AT ALL
SUM OF ALL RESPONSES TO PHQ9 QUESTIONS 1 AND 2: 0

## 2018-01-11 NOTE — PROCEDURES
DATE OF SERVICE:  01/10/2018    INDICATION FOR PROCEDURE:  Atrial fibrillation.    PROCEDURAL COMPONENTS:  1.  EP study with AFib ablation and isolating the pulmonary veins.  2.  Guidance 3 dimensional mapping.  3.  Guidance with intracardiac echocardiography.  4.  Additional linear ablation of the cavotricuspid isthmus.  5.  Programmed stimulation after drug infusion.    PROCEDURE IN DETAIL:  After obtaining informed consent, patient was brought to   cardiac catheterization laboratory in the fasted state.  He presented in   normal sinus rhythm with a RR of 721.  He was prepped and draped in the usual   fashion after being placed under general anesthesia.  We began by getting   access in the right femoral vein with two 8-Omani sheaths in the left femoral   vein with a single 8-Omani sheath in the right internal jugular vein with a   7-Omani sheath.  All were placed under ultrasound guidance.  In the right   internal jugular vein, I placed a Duo-Decapolar coronary sinus catheter that   went from the right atrium to the left atrium and then placed the ICE catheter   to help make a 3-dimensional map and guide transseptal catheterization.    Transseptal was performed with preface sheaths and a Tiana needle and   Preface sheath was crossed, patient was anticoagulated with weight-based   heparin bolus and drip.  One transseptal had a mapping and ablation catheter   irrigated force contact sensing tip made by CARTO.  The other had a PentaRay   mapping catheter.  We made a 3 dimensional shell of the heart with a voltage   map and activation map in sinus.  It was very clear that the left-sided veins   were already fully isolated and there was good scar.  The right-sided veins   did not appear to be isolated.  There was a very solid line of scar that was   able to be mapped and the gap was superior to the right superior.  I have   looked at the old MAP and there was an area that matched this that had the   least set  ablations and another area slightly more anterior.  So our   activation showed that this was also an area of early activation.  We then   used ablator and performed ablation in areas of the scar with any paucity,   paying special attention to this area superiorly and at the start of the   ablation.  The patient developed entrance and exit block from both the   right-sided veins.  I still wanted to test the patient aggressively to make   sure this was blocked, so we gave adenosine with 12 mg, he did not have AV   block, but he had a notable change in his blood pressure and then increase in   his heart rate, suggesting that he did get hyperpolarization and there was no   resumption of conduction thus gave high-dose isoproterenol looking for any   other foci and with 20 mcg per minute he did not have any of the foci.    Because he has been using flecainide, I want to get rid of the need for james   blocking agents with this if he has any further arrhythmia and so I did also   perform cavotricuspid isthmus ablation.  I was able to use the sheath that we   have and I put my ablator once back on the right side at the tricuspid valve   and pulled back the IVC around 6 o'clock on the annulus and developed   bidirectional block across the line.  We did perform pacing both sides to   align and waited 20 minutes and after 20 minutes, he was still blocked both   sides of the line.  We performed single extrastimuli pacing.  AV james ERP was   300 pacing at 500.  His AH was 74.  His HV was 47.  At the end of the case,   he remained in sinus with a UT of 140 with QRS duration of 100.  In this case,   we ablated a total of 4 times and total RF time was 293 seconds.    CONCLUSION:  1.  Identification of the reconnected the right superior pulmonary vein.  2.  Successful ablation and isolation of the right superior pulmonary vein.  3.  Adenosine administration with stimulation showed that this appeared to   remain blocked.  4.  No  inducible tachycardia on high-dose isoproterenol.  5.  Successful cavotricuspid isthmus line with bidirectional block.  6.  Normal conduction intervals with no other inducible tachycardia.    DISCUSSION:  Patient did very well with procedure.  We will give him   anticoagulation as is standard of care, but there was very minimal ablation on   the left side of the heart in the left atrium.  We had a total of   approximately 3 minutes of ablation there, so I do not think he would need any   esophageal protection medicines.  We will have him on oral anticoagulation,   but the threshold to stop it would be low.       ____________________________________     MD SAMARA Denson / VENU    DD:  01/10/2018 19:54:13  DT:  01/10/2018 22:32:40    D#:  [id number]  Job#:  [id number]

## 2018-01-11 NOTE — PROGRESS NOTES
Pt arrived to floor. Report received from Ayo PPU RN. Groin sites CDI bilaterally. VSS. Post op vitals initiated. Report given to night shift RN.

## 2018-01-11 NOTE — TELEPHONE ENCOUNTER
----- Message from Parris Salazar sent at 1/11/2018 11:56 AM PST -----  Regarding: chest pressure after having ablation on 01/10  MARIIA/Josiane      Patient's girlfriend Justina called and said patient had ablation yesterday and this morning he was feeling tightness in his chest (it subsided, and did not happen again) His blood pressure is 145/106, pulse 85. Please call to advise. Ph. #[phone].

## 2018-01-11 NOTE — PROGRESS NOTES
Report received, pt care assumed, tele box on. VSS, pt assessment complete. Pt aaox4, no signs of distress noted at this time. POC discussed with pt and verbalizes no questions. Pt denies chest pain, pain or SOB at this time. Pt denies any additional needs at this time. Bed in lowest position, bed alarm off, pt educated on fall risk and verbalized understanding, call light within reach, will continue to monitor. Family member at bedside.

## 2018-01-11 NOTE — PROGRESS NOTES
Pt resting in bed at this time. Even visible chest rise. No signs of distress. Bed alarm on. Call light in place.  Bed in low and locked position. Bilateral groin cath sites are still CDI.

## 2018-01-11 NOTE — PROGRESS NOTES
2 RN skin check.   Skin intact other than a right neck dressing and bilateral groin cath sites. All dressing are CDI.

## 2018-01-11 NOTE — TELEPHONE ENCOUNTER
Justina reports pt just got home from hospital.  Tightness in chest gone.  BP down to 140's/98.   Said pt has taken his lisinopril.    Recommended pt rest, hydrate, eat, relax.  If BP elevates to 110 diastolic, go to ER.

## 2018-01-11 NOTE — DISCHARGE INSTRUCTIONS
Discharge Instructions    Discharged to home by car with relative. Discharged via walking, hospital escort: Refused.  Special equipment needed: Not Applicable    Be sure to schedule a follow-up appointment with your primary care doctor or any specialists as instructed.     Discharge Plan:   Diet Plan: Discussed  Activity Level: Discussed  Confirmed Follow up Appointment: Appointment Scheduled  Confirmed Symptoms Management: Discussed  Medication Reconciliation Updated: Yes  Influenza Vaccine Indication: Indicated: 9 to 64 years of age  Influenza Vaccine Given - only chart on this line when given: Influenza Vaccine Given (See MAR)    I understand that a diet low in cholesterol, fat, and sodium is recommended for good health. Unless I have been given specific instructions below for another diet, I accept this instruction as my diet prescription.   Other diet: Cardiac    Special Instructions: None    · Is patient discharged on Warfarin / Coumadin?   No     · Is patient Post Blood Transfusion?  NoCardiac Ablation  Cardiac ablation is a procedure to disable a small amount of heart tissue in very specific places. The heart has many electrical connections. Sometimes these connections are abnormal and can cause the heart to beat very fast or irregularly. By disabling some of the problem areas, heart rhythm can be improved or made normal. Ablation is done for people who:   · Have Hoang-Parkinson-White syndrome.    · Have other fast heart rhythms (tachycardia).    · Have taken medicines for an abnormal heart rhythm (arrhythmia) that resulted in:    ¨ No success.    ¨ Side effects.    · May have a high-risk heartbeat that could result in death.    LET YOUR HEALTH CARE PROVIDER KNOW ABOUT:   · Any allergies you have or any previous reactions you have had to X-ray dye, food (such as seafood), medicine, or tape.    · All medicines you are taking, including vitamins, herbs, eye drops, creams, and over-the-counter medicines.     · Previous problems you or members of your family have had with the use of anesthetics.    · Any blood disorders you have.    · Previous surgeries or procedures (such as a kidney transplant) you have had.    · Medical conditions you have (such as kidney failure).    RISKS AND COMPLICATIONS  Generally, cardiac ablation is a safe procedure. However, problems can occur and include:   · Increased risk of cancer. Depending on how long it takes to do the ablation, the dose of radiation can be high.   · Bruising and bleeding where a thin, flexible tube (catheter) was inserted during the procedure.    · Bleeding into the chest, especially into the sac that surrounds the heart (serious).  · Need for a permanent pacemaker if the normal electrical system is damaged.    · The procedure may not be fully effective, and this may not be recognized for months. Repeat ablation procedures are sometimes required.  BEFORE THE PROCEDURE   · Follow any instructions from your health care provider regarding eating and drinking before the procedure.    · Take your medicines as directed at regular times with water, unless instructed otherwise by your health care provider. If you are taking diabetes medicine, including insulin, ask how you are to take it and if there are any special instructions you should follow. It is common to adjust insulin dosing the day of the ablation.    PROCEDURE  · An ablation is usually performed in a catheterization laboratory with the guidance of fluoroscopy. Fluoroscopy is a type of X-ray that helps your health care provider see images of your heart during the procedure.    · An ablation is a minimally invasive procedure. This means a small cut (incision) is made in either your neck or groin. Your health care provider will decide where to make the incision based on your medical history and physical exam.  · An IV tube will be started before the procedure begins. You will be given an anesthetic or medicine to  "help you relax (sedative).  · The skin on your neck or groin will be numbed. A needle will be inserted into a large vein in your neck or groin and catheters will be threaded to your heart.  · A special dye that shows up on fluoroscopy pictures may be injected through the catheter. The dye helps your health care provider see the area of the heart that needs treatment.  · The catheter has electrodes on the tip. When the area of heart tissue that is causing the arrhythmia is found, the catheter tip will send an electrical current to the area and \"scar\" the tissue.  Three types of energy can be used to ablate the heart tissue:    ¨ Heat (radiofrequency energy).    ¨ Laser energy.    ¨ Extreme cold (cryoablation).    · When the area of the heart has been ablated, the catheter will be taken out. Pressure will be held on the insertion site. This will help the insertion site clot and keep it from bleeding. A bandage will be placed on the insertion site.    AFTER THE PROCEDURE   · After the procedure, you will be taken to a recovery area where your vital signs (blood pressure, heart rate, and breathing) will be monitored. The insertion site will also be monitored for bleeding.    · You will need to lie still for 4-6 hours. This is to ensure you do not bleed from the catheter insertion site.       This information is not intended to replace advice given to you by your health care provider. Make sure you discuss any questions you have with your health care provider.     Document Released: 05/06/2010 Document Revised: 01/08/2016 Document Reviewed: 05/12/2014  [employer] Interactive Patient Education ©2016 [employer]  Atrial Fibrillation  Atrial fibrillation is a type of irregular heart rhythm (arrhythmia). During atrial fibrillation, the upper chambers of the heart (atria) quiver continuously in a chaotic pattern. This causes an irregular and often rapid heart rate.   Atrial fibrillation is the result of the heart becoming " overloaded with disorganized signals that tell it to beat. These signals are normally released one at a time by a part of the right atrium called the sinoatrial node. They then travel from the atria to the lower chambers of the heart (ventricles), causing the atria and ventricles to contract and pump blood as they pass. In atrial fibrillation, parts of the atria outside of the sinoatrial node also release these signals. This results in two problems. First, the atria receive so many signals that they do not have time to fully contract. Second, the ventricles, which can only receive one signal at a time, beat irregularly and out of rhythm with the atria.   There are three types of atrial fibrillation:   · Paroxysmal. Paroxysmal atrial fibrillation starts suddenly and stops on its own within a week.  · Persistent. Persistent atrial fibrillation lasts for more than a week. It may stop on its own or with treatment.  · Permanent. Permanent atrial fibrillation does not go away. Episodes of atrial fibrillation may lead to permanent atrial fibrillation.  Atrial fibrillation can prevent your heart from pumping blood normally. It increases your risk of stroke and can lead to heart failure.   CAUSES   · Heart conditions, including a heart attack, heart failure, coronary artery disease, and heart valve conditions.    · Inflammation of the sac that surrounds the heart (pericarditis).  · Blockage of an artery in the lungs (pulmonary embolism).  · Pneumonia or other infections.  · Chronic lung disease.  · Thyroid problems, especially if the thyroid is overactive (hyperthyroidism).  · Caffeine, excessive alcohol use, and use of some illegal drugs.    · Use of some medicines, including certain decongestants and diet pills.  · Heart surgery.    · Birth defects.    Sometimes, no cause can be found. When this happens, the atrial fibrillation is called lone atrial fibrillation. The risk of complications from atrial fibrillation increases  if you have lone atrial fibrillation and you are age 60 years or older.  RISK FACTORS  · Heart failure.  · Coronary artery disease.  · Diabetes mellitus.    · High blood pressure (hypertension).    · Obesity.    · Other arrhythmias.    · Increased age.  SIGNS AND SYMPTOMS   · A feeling that your heart is beating rapidly or irregularly.    · A feeling of discomfort or pain in your chest.    · Shortness of breath.    · Sudden light-headedness or weakness.    · Getting tired easily when exercising.    · Urinating more often than normal (mainly when atrial fibrillation first begins).    In paroxysmal atrial fibrillation, symptoms may start and suddenly stop.  DIAGNOSIS   Your health care provider may be able to detect atrial fibrillation when taking your pulse. Your health care provider may have you take a test called an ambulatory electrocardiogram (ECG). An ECG records your heartbeat patterns over a 24-hour period. You may also have other tests, such as:  · Transthoracic echocardiogram (TTE). During echocardiography, sound waves are used to evaluate how blood flows through your heart.  · Transesophageal echocardiogram (HUGH).  · Stress test. There is more than one type of stress test. If a stress test is needed, ask your health care provider about which type is best for you.  · Chest X-ray exam.  · Blood tests.  · Computed tomography (CT).  TREATMENT   Treatment may include:  · Treating any underlying conditions. For example, if you have an overactive thyroid, treating the condition may correct atrial fibrillation.  · Taking medicine. Medicines may be given to control a rapid heart rate or to prevent blood clots, heart failure, or a stroke.  · Having a procedure to correct the rhythm of the heart:  ¨ Electrical cardioversion. During electrical cardioversion, a controlled, low-energy shock is delivered to the heart through your skin. If you have chest pain, very low blood pressure, or sudden heart failure, this  procedure may need to be done as an emergency.  ¨ Catheter ablation. During this procedure, heart tissues that send the signals that cause atrial fibrillation are destroyed.  ¨ Surgical ablation. During this surgery, thin lines of heart tissue that carry the abnormal signals are destroyed. This procedure can either be an open-heart surgery or a minimally invasive surgery. With the minimally invasive surgery, small cuts are made to access the heart instead of a large opening.  ¨ Pulmonary venous isolation. During this surgery, tissue around the veins that carry blood from the lungs (pulmonary veins) is destroyed. This tissue is thought to carry the abnormal signals.  HOME CARE INSTRUCTIONS   · Take medicines only as directed by your health care provider. Some medicines can make atrial fibrillation worse or recur.  · If blood thinners were prescribed by your health care provider, take them exactly as directed. Too much blood-thinning medicine can cause bleeding. If you take too little, you will not have the needed protection against stroke and other problems.  · Perform blood tests at home if directed by your health care provider. Perform blood tests exactly as directed.  · Quit smoking if you smoke.  · Do not drink alcohol.  · Do not drink caffeinated beverages such as coffee, soda, and some teas. You may drink decaffeinated coffee, soda, or tea.    · Maintain a healthy weight. Do not use diet pills unless your health care provider approves. They may make heart problems worse.    · Follow diet instructions as directed by your health care provider.  · Exercise regularly as directed by your health care provider.  · Keep all follow-up visits as directed by your health care provider. This is important.  PREVENTION   The following substances can cause atrial fibrillation to recur:   · Caffeinated beverages.  · Alcohol.  · Certain medicines, especially those used for breathing problems.  · Certain herbs and herbal  medicines, such as those containing ephedra or ginseng.  · Illegal drugs, such as cocaine and amphetamines.  Sometimes medicines are given to prevent atrial fibrillation from recurring. Proper treatment of any underlying condition is also important in helping prevent recurrence.   SEEK MEDICAL CARE IF:  · You notice a change in the rate, rhythm, or strength of your heartbeat.  · You suddenly begin urinating more frequently.  · You tire more easily when exerting yourself or exercising.  SEEK IMMEDIATE MEDICAL CARE IF:   · You have chest pain, abdominal pain, sweating, or weakness.  · You feel nauseous.  · You have shortness of breath.  · You suddenly have swollen feet and ankles.  · You feel dizzy.  · Your face or limbs feel numb or weak.  · You have a change in your vision or speech.  MAKE SURE YOU:   · Understand these instructions.  · Will watch your condition.  · Will get help right away if you are not doing well or get worse.     This information is not intended to replace advice given to you by your health care provider. Make sure you discuss any questions you have with your health care provider.     Document Released: 12/18/2006 Document Revised: 01/08/2016 Document Reviewed: 04/13/2016  [employer] Interactive Patient Education ©2016 [employer]      Depression / Suicide Risk    As you are discharged from this [facility] facility, it is important to learn how to keep safe from harming yourself.    Recognize the warning signs:  · Abrupt changes in personality, positive or negative- including increase in energy   · Giving away possessions  · Change in eating patterns- significant weight changes-  positive or negative  · Change in sleeping patterns- unable to sleep or sleeping all the time   · Unwillingness or inability to communicate  · Depression  · Unusual sadness, discouragement and loneliness  · Talk of wanting to die  · Neglect of personal appearance   · Rebelliousness- reckless behavior  · Withdrawal  from people/activities they love  · Confusion- inability to concentrate     If you or a loved one observes any of these behaviors or has concerns about self-harm, here's what you can do:  · Talk about it- your feelings and reasons for harming yourself  · Remove any means that you might use to hurt yourself (examples: pills, rope, extension cords, firearm)  · Get professional help from the community (Mental Health, Substance Abuse, psychological counseling)  · Do not be alone:Call your Safe Contact- someone whom you trust who will be there for you.  · Call your local CRISIS HOTLINE 291-9053 or 696-942-4429  · Call your local Children's Mobile Crisis Response Team [place] (202) 241-2060 or www.L[employer]urio  · Call the toll free National Suicide Prevention Hotlines   · National Suicide Prevention Lifeline 389-889-JJSC (0801)  · National Hope Line Network 800-SUICIDE (817-3711)

## 2018-01-11 NOTE — DISCHARGE SUMMARY
CURRENT DIAGNOSES:  1.  Recurrent atrial fibrillation.  2.  Prior history of radiofrequency ablation for his atrial fibrillation.    HISTORY OF PRESENT HOSPITALIZATION:  The patient is a 32-year-old    male followed longitudinally in our office by Dr. Pierre Groves.  Patient has   previously undergone a radiofrequency ablation for atrial fibrillation.  He   had recurrence of symptoms and his ablation was in January of 2016, but as he   was able to obtain insurance he has had recurrence of atrial fibrillation, so   came back in for evaluation.  Patient was admitted on 01/10/2018 for that   purpose.  Please see procedure report.    COMPLICATIONS OF THE PROCEDURE:  None.    CONCLUSIONS OF THE REPORT WERE:  1.  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  2.  Successful ablation and isolation of the right superior pulmonary vein.  3.  Adenosine administration and stimulation showed that this appeared to be   the main block.  4.  No inducible tachycardia on high-dose isoproterenol.  5.  Successful cavotricuspid isthmus line with bidirectional block.  6.  Normal conduction intervals with no other inducible tachycardia.    The discussion by Dr. Groves was to resume anticoagulation.  He felt that he   did not need esophageal protection since there was no ablation in the area   adjacent to the esophagus.  The patient's blood pressure has been mildly   elevated diastolically and therefore we will reinstitute his lisinopril, which   he had been taking previously, but without insurance coverage had stopped.    CATHETER ACCESS SITES:  Bilateral groins, right neck and right radial artery   are uncomplicated.    VITAL SIGNS:  This morning have been stable, rhythm through the night has been   sinus.  He has been afebrile at 36.4, blood pressures as noted 128/92 to a   high of 149/92.  Admission weight was 77.4.  Weight this morning was 77.1.    LABORATORY DATA:  H and H prior to admission was 15.3 and 44.2.   Electrolytes:    Sodium 141, potassium 3.5, chloride 107, CO2 of 28, BUN 14, and creatinine   0.96 with an EGFR greater than 60.  INR was 1.06.    DISCHARGE MEDICATIONS:  Will include lisinopril 5 mg daily and Eliquis 5 mg   b.i.d.    IMPRESSION:  1.  Recurrence of atrial fibrillation.  2.  Repeat radiofrequency ablation for atrial fibrillation and flutter by Dr. Pierre Groves on 01/10/2018.  3.  Hypertension.    PLAN:  The patient will be discharged to home.  He was given instructions in   regard to reporting to the emergency room for any of the following, increasing   chest pain, increasing shortness of breath, focal neurological changes,   hemoptysis, any problem with catheter access sites as well as the right radial   art line in terms of drainage, redness or discomfort.  Patient will be seen   in followup in our office.  He has intermittent unusual pain and numbness in   the left arm, suspicious for thoracic outlet syndrome.  We will order vascular   studies when he returns to the office.       ____________________________________     RAZA Oseguera / NTS    DD:  01/11/2018 08:42:54  DT:  01/11/2018 09:20:24    D#:  [id number]  Job#:  [id number]    cc: Pierre Groves MD

## 2018-01-11 NOTE — OR NURSING
1608 Patient arrived from cath lab s/p a.fib ablation bilateral groin dressing clean dry intact, patient denies pain, s.o.b, vss, plan of care discussed with patient and family agreeable. artline right radial.  1634 artline removed gauze and tegaderm applied no bleeding no hematoma.  1700 bilateral groin dressing clean dry intact.  1837 report given to tresa GODINEZ [address] all questions answered  1840 patient transported to room [address] with all his personal belongings.  1850 patient transferred from [facility] to [facility], assessed bilateral groin with HERBERT cortes no bleeding or hematoma, patient not in distress, tele box on.

## 2018-01-11 NOTE — PROGRESS NOTES
Discharge instructions given to patient. Prescriptions given to patient. Discharge instructions given to patient at bedside, verbalizes understanding and states plans for follow-up with PCP. New and home medication review, post-discharge activity level and worsening of symptoms needing follow-up care discussed. Telemetry monitor/IV cathlon removed. All belongings accounted for, all questions answered at this time. Patient refused wheelchair and escort out, patient wants to walk out with wife.

## 2018-01-11 NOTE — PROGRESS NOTES
Assumed care of PT A&O x  4. Pt resting in bed with no signs of labored breathing. On RA. Tele monitor in place, cardiac rhythm being monitored, pt is in SR rate in the 70's. Call light within reach, bed in lowest position, upper bed rails up. Post op vitals in place. Family at bedside. Pt was updated on plan of care for the night . Will continue to monitor.

## 2018-01-11 NOTE — CARE PLAN
Problem: Safety  Goal: Will remain free from injury  Outcome: PROGRESSING AS EXPECTED  Pt mobility assessed at beginning of shift. Pt is standby assist. Fall precautions in place. Non-slip socks on. Bed in lowest locked position. Bed alarm on. Call light within reach. Pt educated to call for assistance and verbalizes understanding.    Problem: Knowledge Deficit  Goal: Knowledge of disease process/condition, treatment plan, diagnostic tests, and medications will improve  Outcome: PROGRESSING AS EXPECTED  Pt educated about disease process. Reason why medications are taken. And informed about treatment plan.

## 2018-01-12 ENCOUNTER — APPOINTMENT (OUTPATIENT)
Dept: RADIOLOGY | Facility: MEDICAL CENTER | Age: 33
End: 2018-01-12
Attending: EMERGENCY MEDICINE
Payer: COMMERCIAL

## 2018-01-12 ENCOUNTER — HOSPITAL ENCOUNTER (EMERGENCY)
Facility: MEDICAL CENTER | Age: 33
End: 2018-01-12
Attending: EMERGENCY MEDICINE
Payer: COMMERCIAL

## 2018-01-12 VITALS
HEIGHT: 66 IN | DIASTOLIC BLOOD PRESSURE: 86 MMHG | TEMPERATURE: 97.1 F | WEIGHT: 170 LBS | OXYGEN SATURATION: 95 % | SYSTOLIC BLOOD PRESSURE: 126 MMHG | RESPIRATION RATE: 16 BRPM | BODY MASS INDEX: 27.32 KG/M2 | HEART RATE: 68 BPM

## 2018-01-12 DIAGNOSIS — R07.89 ATYPICAL CHEST PAIN: ICD-10-CM

## 2018-01-12 LAB
ALBUMIN SERPL BCP-MCNC: 4.4 G/DL (ref 3.2–4.9)
ALBUMIN/GLOB SERPL: 1.4 G/DL
ALP SERPL-CCNC: 51 U/L (ref 30–99)
ALT SERPL-CCNC: 20 U/L (ref 2–50)
ANION GAP SERPL CALC-SCNC: 7 MMOL/L (ref 0–11.9)
APTT PPP: 26.7 SEC (ref 24.7–36)
AST SERPL-CCNC: 15 U/L (ref 12–45)
BASOPHILS # BLD AUTO: 0.6 % (ref 0–1.8)
BASOPHILS # BLD: 0.06 K/UL (ref 0–0.12)
BILIRUB SERPL-MCNC: 0.7 MG/DL (ref 0.1–1.5)
BUN SERPL-MCNC: 16 MG/DL (ref 8–22)
CALCIUM SERPL-MCNC: 9.2 MG/DL (ref 8.5–10.5)
CHLORIDE SERPL-SCNC: 105 MMOL/L (ref 96–112)
CK MB SERPL-MCNC: 0.4 NG/ML (ref 0–5)
CO2 SERPL-SCNC: 27 MMOL/L (ref 20–33)
CREAT SERPL-MCNC: 0.88 MG/DL (ref 0.5–1.4)
EKG IMPRESSION: NORMAL
EOSINOPHIL # BLD AUTO: 0.09 K/UL (ref 0–0.51)
EOSINOPHIL NFR BLD: 0.9 % (ref 0–6.9)
ERYTHROCYTE [DISTWIDTH] IN BLOOD BY AUTOMATED COUNT: 37 FL (ref 35.9–50)
GLOBULIN SER CALC-MCNC: 3.2 G/DL (ref 1.9–3.5)
GLUCOSE SERPL-MCNC: 89 MG/DL (ref 65–99)
HCT VFR BLD AUTO: 45.4 % (ref 42–52)
HGB BLD-MCNC: 15.6 G/DL (ref 14–18)
IMM GRANULOCYTES # BLD AUTO: 0.08 K/UL (ref 0–0.11)
IMM GRANULOCYTES NFR BLD AUTO: 0.8 % (ref 0–0.9)
INR PPP: 1.12 (ref 0.87–1.13)
LV EJECT FRACT  99904: 70
LV EJECT FRACT MOD 2C 99903: 83.27
LV EJECT FRACT MOD 4C 99902: 67.58
LV EJECT FRACT MOD BP 99901: 76.82
LYMPHOCYTES # BLD AUTO: 1.65 K/UL (ref 1–4.8)
LYMPHOCYTES NFR BLD: 16.4 % (ref 22–41)
MCH RBC QN AUTO: 27.7 PG (ref 27–33)
MCHC RBC AUTO-ENTMCNC: 34.4 G/DL (ref 33.7–35.3)
MCV RBC AUTO: 80.6 FL (ref 81.4–97.8)
MONOCYTES # BLD AUTO: 0.44 K/UL (ref 0–0.85)
MONOCYTES NFR BLD AUTO: 4.4 % (ref 0–13.4)
NEUTROPHILS # BLD AUTO: 7.74 K/UL (ref 1.82–7.42)
NEUTROPHILS NFR BLD: 76.9 % (ref 44–72)
NRBC # BLD AUTO: 0 K/UL
NRBC BLD-RTO: 0 /100 WBC
PLATELET # BLD AUTO: 252 K/UL (ref 164–446)
PMV BLD AUTO: 9.6 FL (ref 9–12.9)
POTASSIUM SERPL-SCNC: 3.6 MMOL/L (ref 3.6–5.5)
PROT SERPL-MCNC: 7.6 G/DL (ref 6–8.2)
PROTHROMBIN TIME: 14.1 SEC (ref 12–14.6)
RBC # BLD AUTO: 5.63 M/UL (ref 4.7–6.1)
SODIUM SERPL-SCNC: 139 MMOL/L (ref 135–145)
TROPONIN I SERPL-MCNC: 0.32 NG/ML (ref 0–0.04)
TROPONIN I SERPL-MCNC: 0.36 NG/ML (ref 0–0.04)
WBC # BLD AUTO: 10.1 K/UL (ref 4.8–10.8)

## 2018-01-12 PROCEDURE — 85025 COMPLETE CBC W/AUTO DIFF WBC: CPT

## 2018-01-12 PROCEDURE — 99284 EMERGENCY DEPT VISIT MOD MDM: CPT

## 2018-01-12 PROCEDURE — 84484 ASSAY OF TROPONIN QUANT: CPT

## 2018-01-12 PROCEDURE — 82553 CREATINE MB FRACTION: CPT

## 2018-01-12 PROCEDURE — 80053 COMPREHEN METABOLIC PANEL: CPT

## 2018-01-12 PROCEDURE — 71045 X-RAY EXAM CHEST 1 VIEW: CPT

## 2018-01-12 PROCEDURE — 93005 ELECTROCARDIOGRAM TRACING: CPT | Performed by: EMERGENCY MEDICINE

## 2018-01-12 PROCEDURE — 93306 TTE W/DOPPLER COMPLETE: CPT

## 2018-01-12 PROCEDURE — 36415 COLL VENOUS BLD VENIPUNCTURE: CPT

## 2018-01-12 PROCEDURE — 93306 TTE W/DOPPLER COMPLETE: CPT | Mod: 26 | Performed by: INTERNAL MEDICINE

## 2018-01-12 PROCEDURE — 85610 PROTHROMBIN TIME: CPT

## 2018-01-12 PROCEDURE — 85730 THROMBOPLASTIN TIME PARTIAL: CPT

## 2018-01-12 ASSESSMENT — PAIN SCALES - GENERAL
PAINLEVEL_OUTOF10: 3
PAINLEVEL_OUTOF10: 0
PAINLEVEL_OUTOF10: 5

## 2018-01-12 NOTE — ED NOTES
"Elian Sanchez 32 y.o. male bib EMS from home.    Pt has cardiac ablation for a-fib on 1-10-18 and was discharged home feeling well yesterday.  This morning, around 20 minutes ago, pt had sudden on set \"bursting pain\" at R wrist insertion site from ablation.  This pain traveled up pt's R arm, across into his chest when he became lightheaded, numb throughout his body and dizzy.  Pt reports he bent down onto the ground.  Pain traveled into his L arm.  EMS reports patient \"dripping with sweat\" on their arrival, but pt reported pain has mostly decreased.  Pt arrives to ED with \"minimal\" pain to L arm only.  Denies current dizziness or numbness, feels \"drained.\"  /86   Pulse 84   Resp 16   Ht 1.676 m (5' 6\")   Wt 77.1 kg (170 lb)   SpO2 94%   BMI 27.44 kg/m²     "

## 2018-01-12 NOTE — ED PROVIDER NOTES
ED Provider Note    CHIEF COMPLAINT  Chief Complaint   Patient presents with   • Chest Pain       HPI  Elian Sanchez is a 32 y.o. male who presents for evaluation of right arm pain chest pain, fatigue. The patient has an extensive medical history. He just recently underwent ablation with Dr. Groves for underlying tachydysrhythmia. This was apparently has 2nd or 3rd attempt. He is on anticoagulation therapy. He underwent the following procedure:    PROCEDURAL COMPONENTS:  1.  EP study with AFib ablation and isolating the pulmonary veins.  2.  Guidance 3 dimensional mapping.  3.  Guidance with intracardiac echocardiography.  4.  Additional linear ablation of the cavotricuspid isthmus.  5.  Programmed stimulation after drug infusion.    Apparently there were no complications and he did well. He is discharged yesterday. He reports pain at the site of the catheterization in the right radial artery. He denies hemoptysis. He initially had a wave of pain earlier today with associated diaphoresis but currently has no chest pain    REVIEW OF SYSTEMS  See HPI for further details. No night sweats weight loss numbness tingling weakness rash All other systems are negative.     PAST MEDICAL HISTORY  Past Medical History:   Diagnosis Date   • Pneumonia 2016    post cardiac ablation   • Arrhythmia     afib   • Bowel habit changes     diarrhea   • Breath shortness     with exertion   • Cardiac rhythm disturbance    • Dental disorder     upper left dental pain   • Hemorrhagic disorder (CMS-HCC)     nose bleeds, even before Xarelto   • Hypertension    • Snoring        FAMILY HISTORY  No history of bleeding disorder     SOCIAL HISTORY  Social History     Social History   • Marital status: Single     Spouse name: N/A   • Number of children: N/A   • Years of education: N/A     Social History Main Topics   • Smoking status: Never Smoker   • Smokeless tobacco: Never Used   • Alcohol use Yes      Comment: occasional   • Drug use:       "Types: Inhaled      Comment: marijuana   • Sexual activity: Not on file     Other Topics Concern   • Not on file     Social History Narrative   • No narrative on file       SURGICAL HISTORY  Past Surgical History:   Procedure Laterality Date   • RECOVERY  1/20/2016    Procedure: CATH LAB A-FIB ABLATION LRG GRP APARNA FARMER ;  Surgeon: [facility];  [place] [facility] [place]:    • OTHER CARDIAC SURGERY  01/16/2016    ablation   • OTHER ORTHOPEDIC SURGERY  1993    Pin place to hold joint in place.        CURRENT MEDICATIONS  Home Medications     Reviewed by Alma Mojica R.N. (Registered Nurse) on 01/12/18 at 1205  Med List Status: Partial   Medication Last Dose Status   acetaminophen (TYLENOL) 325 MG Tab unsure Active   apixaban (ELIQUIS) 5mg Tab 1/12/2018 Active                ALLERGIES  No Known Allergies    PHYSICAL EXAM  VITAL SIGNS: /86   Pulse 68   Temp 36.2 °C (97.1 °F)   Resp 16   Ht 1.676 m (5' 6\")   Wt 77.1 kg (170 lb)   SpO2 95%   BMI 27.44 kg/m²  Room air O2: 94    Constitutional: Well developed, Well nourished, No acute distress, Non-toxic appearance.   HENT: Normocephalic, Atraumatic, Bilateral external ears normal, Oropharynx moist, No oral exudates, Nose normal.   Eyes: PERRLA, EOMI, Conjunctiva normal, No discharge.   Neck: Normal range of motion, No tenderness, Supple, No stridor.   Cardiovascular: Normal heart rate, Normal rhythm, No murmurs, No rubs, No gallops.   Thorax & Lungs: Normal breath sounds, No respiratory distress, No wheezing, No chest tenderness.   Abdomen: Bowel sounds normal, Soft, No tenderness, No masses, No pulsatile masses.   Skin: Warm, Dry, No erythema, No rash.   Back: No tenderness, No CVA tenderness.   Extremities: Intact distal pulses, No edema, No tenderness, No cyanosis, No clubbing. Catheter insertion site at right radial artery location is clean dry and intact dressing is intact no bleeding no pulsatile mass   Neurologic: " Alert & oriented x 3, Normal motor function, Normal sensory function, No focal deficits noted.   Psychiatric: Anxious    EKG  EKG Interpretation    Interpreted by me    Rhythm: normal sinus   Rate: normal  Axis: normal  Ectopy: none  Conduction: normal  ST Segments: no acute change  T Waves: no acute change  Q Waves: none    Clinical Impression: no acute changes and normal EKG    RADIOLOGY/PROCEDURES  ECHOCARDIOGRAM COMP W/O CONT   Final Result      DX-CHEST-PORTABLE (1 VIEW)   Final Result      No acute cardiopulmonary disease.      ECHOCARDIOGRAM LTD W/O CONT    (Results Pending)     Results for orders placed or performed during the hospital encounter of 01/12/18   CBC WITH DIFFERENTIAL   Result Value Ref Range    WBC 10.1 4.8 - 10.8 K/uL    RBC 5.63 4.70 - 6.10 M/uL    Hemoglobin 15.6 14.0 - 18.0 g/dL    Hematocrit 45.4 42.0 - 52.0 %    MCV 80.6 (L) 81.4 - 97.8 fL    MCH 27.7 27.0 - 33.0 pg    MCHC 34.4 33.7 - 35.3 g/dL    RDW 37.0 35.9 - 50.0 fL    Platelet Count 252 164 - 446 K/uL    MPV 9.6 9.0 - 12.9 fL    Neutrophils-Polys 76.90 (H) 44.00 - 72.00 %    Lymphocytes 16.40 (L) 22.00 - 41.00 %    Monocytes 4.40 0.00 - 13.40 %    Eosinophils 0.90 0.00 - 6.90 %    Basophils 0.60 0.00 - 1.80 %    Immature Granulocytes 0.80 0.00 - 0.90 %    Nucleated RBC 0.00 /100 WBC    Neutrophils (Absolute) 7.74 (H) 1.82 - 7.42 K/uL    Lymphs (Absolute) 1.65 1.00 - 4.80 K/uL    Monos (Absolute) 0.44 0.00 - 0.85 K/uL    Eos (Absolute) 0.09 0.00 - 0.51 K/uL    Baso (Absolute) 0.06 0.00 - 0.12 K/uL    Immature Granulocytes (abs) 0.08 0.00 - 0.11 K/uL    NRBC (Absolute) 0.00 K/uL   COMP METABOLIC PANEL   Result Value Ref Range    Sodium 139 135 - 145 mmol/L    Potassium 3.6 3.6 - 5.5 mmol/L    Chloride 105 96 - 112 mmol/L    Co2 27 20 - 33 mmol/L    Anion Gap 7.0 0.0 - 11.9    Glucose 89 65 - 99 mg/dL    Bun 16 8 - 22 mg/dL    Creatinine 0.88 0.50 - 1.40 mg/dL    Calcium 9.2 8.5 - 10.5 mg/dL    AST(SGOT) 15 12 - 45 U/L    ALT(SGPT) 20  2 - 50 U/L    Alkaline Phosphatase 51 30 - 99 U/L    Total Bilirubin 0.7 0.1 - 1.5 mg/dL    Albumin 4.4 3.2 - 4.9 g/dL    Total Protein 7.6 6.0 - 8.2 g/dL    Globulin 3.2 1.9 - 3.5 g/dL    A-G Ratio 1.4 g/dL   TROPONIN   Result Value Ref Range    Troponin I 0.36 (H) 0.00 - 0.04 ng/mL   PROTHROMBIN TIME   Result Value Ref Range    PT 14.1 12.0 - 14.6 sec    INR 1.12 0.87 - 1.13   APTT   Result Value Ref Range    APTT 26.7 24.7 - 36.0 sec   ESTIMATED GFR   Result Value Ref Range    GFR If African American >60 >60 mL/min/1.73 m 2    GFR If Non African American >60 >60 mL/min/1.73 m 2   TROPONIN   Result Value Ref Range    Troponin I 0.32 (H) 0.00 - 0.04 ng/mL   CKMB   Result Value Ref Range    CK-Mb 0.4 0.0 - 5.0 ng/mL   EKG (ER)   Result Value Ref Range    Report       [facility]    Test Date:  2018  Pt Name:    OLESYA TAYLOR                Department: ER  MRN:        [id number]                      Room:       [facility]  Gender:     Male                         Technician: [id number]  :        1985                   Requested By:EDITH EDDY  Order #:    [id number]                    Reading MD: EDITH EDDY MD    Measurements  Intervals                                Axis  Rate:       72                           P:          24  AL:         152                          QRS:        68  QRSD:       86                           T:          0  QT:         384  QTc:        421    Interpretive Statements  SINUS RHYTHM  Compared to ECG 2018 07:33:57  No significant changes    Electronically Signed On 2018 12:55:11 PST by EDITH EDDY MD     ECHOCARDIOGRAM COMP W/O CONT   Result Value Ref Range    Eject.Frac. MOD BP 76.82     Eject.Frac. MOD 4C 67.58     Eject.Frac. MOD 2C 83.27     Left Ventrical Ejection Fraction 70         COURSE & MEDICAL DECISION MAKING  Pertinent Labs & Imaging studies reviewed. (See chart for details)  Consultation with Dr. Frazier was obtained  with cardiology. The patient did not have ongoing chest pain his EKG did not demonstrate ischemia. He had a slight bump in his troponin of 0.36 which could possibly be due to ischemia but much more likely from his recent cardiac procedure. Stat echocardiogram performed which did not demonstrate any regional wall motion deficit or pericardial effusion. Troponin was checked 2 hours later and is trending down and his CK-MB is not elevated suggesting against ischemia. I recommended the patient take Tylenol for pain due to his underlying anticoagulated state    FINAL IMPRESSION  1. Atypical chest pain  2. Troponin leak after cardiac procedure      Electronically signed by: Alex Bashir, 1/12/2018 12:20 PM

## 2018-01-13 NOTE — DISCHARGE INSTRUCTIONS
Chest Wall Pain  Chest wall pain is pain felt in or around the chest bones and muscles. It may take up to 6 weeks to get better. It may take longer if you are active. Chest wall pain can happen on its own. Other times, things like germs, injury, coughing, or exercise can cause the pain.  HOME CARE   · Avoid activities that make you tired or cause pain. Try not to use your chest, belly (abdominal), or side muscles. Do not use heavy weights.  · Put ice on the sore area.  ¨ Put ice in a plastic bag.  ¨ Place a towel between your skin and the bag.  ¨ Leave the ice on for 15-20 minutes for the first 2 days.  · Only take medicine as told by your doctor.  GET HELP RIGHT AWAY IF:   · You have more pain or are very uncomfortable.  · You have a fever.  · Your chest pain gets worse.  · You have new problems.  · You feel sick to your stomach (nauseous) or throw up (vomit).  · You start to sweat or feel lightheaded.  · You have a cough with mucus (phlegm).  · You cough up blood.  MAKE SURE YOU:   · Understand these instructions.  · Will watch your condition.  · Will get help right away if you are not doing well or get worse.     This information is not intended to replace advice given to you by your health care provider. Make sure you discuss any questions you have with your health care provider.     Document Released: 06/05/2009 Document Revised: 03/11/2013 Document Reviewed: 03/14/2016  [employer] Patient Education ©2016 [employer]    Chest Pain, Nonspecific  It is often hard to give a specific diagnosis for the cause of chest pain. There is always a chance that your pain could be related to something serious, like a heart attack or a blood clot in the lungs. You need to follow up with your caregiver for further evaluation. More lab tests or other studies such as X-rays, electrocardiography, stress testing, or cardiac imaging may be needed to find the cause of your pain.  Most of the time, nonspecific chest pain  improves within 2 to 3 days with rest and mild pain medicine. For the next few days, avoid physical exertion or activities that bring on pain. Do not smoke. Avoid drinking alcohol. Call your caregiver for routine follow-up as advised.   SEEK IMMEDIATE MEDICAL CARE IF:  · You develop increased chest pain or pain that radiates to the arm, neck, jaw, back, or abdomen.   · You develop shortness of breath, increased coughing, or you start coughing up blood.   · You have severe back or abdominal pain, nausea, or vomiting.   · You develop severe weakness, fainting, fever, or chills.   Document Released: 12/18/2006 Document Revised: 03/11/2013 Document Reviewed: 06/06/2008  [employer]® Patient Information ©2013 [employer].

## 2018-01-13 NOTE — ED NOTES
Okay for d/c per ERP. Patient reports readiness for discharge. PIV removed. Discharge instructions and follow-up care reviewed with patient. All questions answered and patient verbalized understanding. Vss. Patient stable and ambulatory upon d/c from ED.

## 2018-01-13 NOTE — ED NOTES
Discharge instructions and follow-up care reviewed with patient. All questions answered and patient verbalized understanding. Vss. Patient stable and ambulatory upon d/c from ED.

## 2018-01-14 ENCOUNTER — HOSPITAL ENCOUNTER (INPATIENT)
Facility: MEDICAL CENTER | Age: 33
LOS: 3 days | DRG: 641 | End: 2018-01-17
Attending: EMERGENCY MEDICINE | Admitting: FAMILY MEDICINE
Payer: COMMERCIAL

## 2018-01-14 ENCOUNTER — APPOINTMENT (OUTPATIENT)
Dept: RADIOLOGY | Facility: MEDICAL CENTER | Age: 33
DRG: 641 | End: 2018-01-14
Attending: FAMILY MEDICINE
Payer: COMMERCIAL

## 2018-01-14 ENCOUNTER — RESOLUTE PROFESSIONAL BILLING HOSPITAL PROF FEE (OUTPATIENT)
Dept: HOSPITALIST | Facility: MEDICAL CENTER | Age: 33
End: 2018-01-14
Payer: COMMERCIAL

## 2018-01-14 ENCOUNTER — APPOINTMENT (OUTPATIENT)
Dept: RADIOLOGY | Facility: MEDICAL CENTER | Age: 33
DRG: 641 | End: 2018-01-14
Attending: EMERGENCY MEDICINE
Payer: COMMERCIAL

## 2018-01-14 DIAGNOSIS — R00.0 TACHYCARDIA: ICD-10-CM

## 2018-01-14 DIAGNOSIS — I10 HYPERTENSION, UNSPECIFIED TYPE: ICD-10-CM

## 2018-01-14 PROBLEM — R11.2 NAUSEA & VOMITING: Status: ACTIVE | Noted: 2018-01-14

## 2018-01-14 PROBLEM — Z86.79 HISTORY OF ATRIAL FIBRILLATION: Status: ACTIVE | Noted: 2018-01-14

## 2018-01-14 PROBLEM — R79.89 ELEVATED TROPONIN: Status: ACTIVE | Noted: 2018-01-14

## 2018-01-14 PROBLEM — E87.6 HYPOKALEMIA: Status: ACTIVE | Noted: 2018-01-14

## 2018-01-14 PROBLEM — E83.39 HYPOPHOSPHATEMIA: Status: ACTIVE | Noted: 2018-01-14

## 2018-01-14 PROBLEM — E83.42 HYPOMAGNESEMIA: Status: ACTIVE | Noted: 2018-01-14

## 2018-01-14 LAB
ALBUMIN SERPL BCP-MCNC: 5.2 G/DL (ref 3.2–4.9)
ALBUMIN/GLOB SERPL: 1.6 G/DL
ALP SERPL-CCNC: 56 U/L (ref 30–99)
ALT SERPL-CCNC: 21 U/L (ref 2–50)
ANION GAP SERPL CALC-SCNC: 9 MMOL/L (ref 0–11.9)
APTT PPP: 33.6 SEC (ref 24.7–36)
AST SERPL-CCNC: 17 U/L (ref 12–45)
BASOPHILS # BLD AUTO: 0.4 % (ref 0–1.8)
BASOPHILS # BLD: 0.03 K/UL (ref 0–0.12)
BILIRUB SERPL-MCNC: 0.7 MG/DL (ref 0.1–1.5)
BNP SERPL-MCNC: 17 PG/ML (ref 0–100)
BUN SERPL-MCNC: 11 MG/DL (ref 8–22)
CALCIUM SERPL-MCNC: 9.7 MG/DL (ref 8.5–10.5)
CHLORIDE SERPL-SCNC: 104 MMOL/L (ref 96–112)
CO2 SERPL-SCNC: 25 MMOL/L (ref 20–33)
CREAT SERPL-MCNC: 0.86 MG/DL (ref 0.5–1.4)
EKG IMPRESSION: NORMAL
EOSINOPHIL # BLD AUTO: 0.07 K/UL (ref 0–0.51)
EOSINOPHIL NFR BLD: 0.8 % (ref 0–6.9)
ERYTHROCYTE [DISTWIDTH] IN BLOOD BY AUTOMATED COUNT: 35.1 FL (ref 35.9–50)
GLOBULIN SER CALC-MCNC: 3.2 G/DL (ref 1.9–3.5)
GLUCOSE SERPL-MCNC: 83 MG/DL (ref 65–99)
HCT VFR BLD AUTO: 46 % (ref 42–52)
HGB BLD-MCNC: 16.1 G/DL (ref 14–18)
IMM GRANULOCYTES # BLD AUTO: 0.04 K/UL (ref 0–0.11)
IMM GRANULOCYTES NFR BLD AUTO: 0.5 % (ref 0–0.9)
INR PPP: 1.08 (ref 0.87–1.13)
LV EJECT FRACT  99904: 65
LYMPHOCYTES # BLD AUTO: 0.35 K/UL (ref 1–4.8)
LYMPHOCYTES NFR BLD: 4.2 % (ref 22–41)
MAGNESIUM SERPL-MCNC: 1.7 MG/DL (ref 1.5–2.5)
MCH RBC QN AUTO: 27.7 PG (ref 27–33)
MCHC RBC AUTO-ENTMCNC: 35 G/DL (ref 33.7–35.3)
MCV RBC AUTO: 79 FL (ref 81.4–97.8)
MONOCYTES # BLD AUTO: 0.64 K/UL (ref 0–0.85)
MONOCYTES NFR BLD AUTO: 7.6 % (ref 0–13.4)
NEUTROPHILS # BLD AUTO: 7.25 K/UL (ref 1.82–7.42)
NEUTROPHILS NFR BLD: 86.5 % (ref 44–72)
NRBC # BLD AUTO: 0 K/UL
NRBC BLD-RTO: 0 /100 WBC
PHOSPHATE SERPL-MCNC: 1.5 MG/DL (ref 2.5–4.5)
PLATELET # BLD AUTO: 257 K/UL (ref 164–446)
PMV BLD AUTO: 9.7 FL (ref 9–12.9)
POTASSIUM SERPL-SCNC: 2.9 MMOL/L (ref 3.6–5.5)
PROT SERPL-MCNC: 8.4 G/DL (ref 6–8.2)
PROTHROMBIN TIME: 13.7 SEC (ref 12–14.6)
RBC # BLD AUTO: 5.82 M/UL (ref 4.7–6.1)
SODIUM SERPL-SCNC: 138 MMOL/L (ref 135–145)
TROPONIN I SERPL-MCNC: 0.05 NG/ML (ref 0–0.04)
TROPONIN I SERPL-MCNC: 0.06 NG/ML (ref 0–0.04)
TSH SERPL DL<=0.005 MIU/L-ACNC: 1.61 UIU/ML (ref 0.38–5.33)
WBC # BLD AUTO: 8.4 K/UL (ref 4.8–10.8)

## 2018-01-14 PROCEDURE — 700102 HCHG RX REV CODE 250 W/ 637 OVERRIDE(OP): Performed by: FAMILY MEDICINE

## 2018-01-14 PROCEDURE — 85730 THROMBOPLASTIN TIME PARTIAL: CPT

## 2018-01-14 PROCEDURE — 700105 HCHG RX REV CODE 258: Performed by: FAMILY MEDICINE

## 2018-01-14 PROCEDURE — 85610 PROTHROMBIN TIME: CPT

## 2018-01-14 PROCEDURE — 83735 ASSAY OF MAGNESIUM: CPT

## 2018-01-14 PROCEDURE — 96375 TX/PRO/DX INJ NEW DRUG ADDON: CPT

## 2018-01-14 PROCEDURE — 71275 CT ANGIOGRAPHY CHEST: CPT

## 2018-01-14 PROCEDURE — A9270 NON-COVERED ITEM OR SERVICE: HCPCS | Performed by: FAMILY MEDICINE

## 2018-01-14 PROCEDURE — 700105 HCHG RX REV CODE 258: Performed by: EMERGENCY MEDICINE

## 2018-01-14 PROCEDURE — 99285 EMERGENCY DEPT VISIT HI MDM: CPT

## 2018-01-14 PROCEDURE — 80053 COMPREHEN METABOLIC PANEL: CPT

## 2018-01-14 PROCEDURE — 700105 HCHG RX REV CODE 258: Performed by: INTERNAL MEDICINE

## 2018-01-14 PROCEDURE — 36415 COLL VENOUS BLD VENIPUNCTURE: CPT

## 2018-01-14 PROCEDURE — 93005 ELECTROCARDIOGRAM TRACING: CPT

## 2018-01-14 PROCEDURE — 99221 1ST HOSP IP/OBS SF/LOW 40: CPT | Performed by: FAMILY MEDICINE

## 2018-01-14 PROCEDURE — 700111 HCHG RX REV CODE 636 W/ 250 OVERRIDE (IP): Performed by: EMERGENCY MEDICINE

## 2018-01-14 PROCEDURE — 83880 ASSAY OF NATRIURETIC PEPTIDE: CPT

## 2018-01-14 PROCEDURE — 84100 ASSAY OF PHOSPHORUS: CPT

## 2018-01-14 PROCEDURE — 700101 HCHG RX REV CODE 250: Performed by: EMERGENCY MEDICINE

## 2018-01-14 PROCEDURE — 700111 HCHG RX REV CODE 636 W/ 250 OVERRIDE (IP): Performed by: FAMILY MEDICINE

## 2018-01-14 PROCEDURE — 87040 BLOOD CULTURE FOR BACTERIA: CPT | Mod: 91

## 2018-01-14 PROCEDURE — 84484 ASSAY OF TROPONIN QUANT: CPT | Mod: 91

## 2018-01-14 PROCEDURE — 770020 HCHG ROOM/CARE - TELE (206)

## 2018-01-14 PROCEDURE — 700117 HCHG RX CONTRAST REV CODE 255: Performed by: EMERGENCY MEDICINE

## 2018-01-14 PROCEDURE — 71045 X-RAY EXAM CHEST 1 VIEW: CPT

## 2018-01-14 PROCEDURE — 700101 HCHG RX REV CODE 250: Performed by: FAMILY MEDICINE

## 2018-01-14 PROCEDURE — 93308 TTE F-UP OR LMTD: CPT

## 2018-01-14 PROCEDURE — 84443 ASSAY THYROID STIM HORMONE: CPT

## 2018-01-14 PROCEDURE — 93005 ELECTROCARDIOGRAM TRACING: CPT | Performed by: EMERGENCY MEDICINE

## 2018-01-14 PROCEDURE — 96365 THER/PROPH/DIAG IV INF INIT: CPT

## 2018-01-14 PROCEDURE — 85025 COMPLETE CBC W/AUTO DIFF WBC: CPT

## 2018-01-14 RX ORDER — AMOXICILLIN 250 MG
2 CAPSULE ORAL 2 TIMES DAILY
Status: DISCONTINUED | OUTPATIENT
Start: 2018-01-14 | End: 2018-01-17

## 2018-01-14 RX ORDER — POLYETHYLENE GLYCOL 3350 17 G/17G
1 POWDER, FOR SOLUTION ORAL
Status: DISCONTINUED | OUTPATIENT
Start: 2018-01-14 | End: 2018-01-17

## 2018-01-14 RX ORDER — SODIUM CHLORIDE 9 MG/ML
1000 INJECTION, SOLUTION INTRAVENOUS ONCE
Status: COMPLETED | OUTPATIENT
Start: 2018-01-14 | End: 2018-01-14

## 2018-01-14 RX ORDER — ONDANSETRON 2 MG/ML
4 INJECTION INTRAMUSCULAR; INTRAVENOUS EVERY 4 HOURS PRN
Status: DISCONTINUED | OUTPATIENT
Start: 2018-01-14 | End: 2018-01-17 | Stop reason: HOSPADM

## 2018-01-14 RX ORDER — PROMETHAZINE HYDROCHLORIDE 25 MG/1
12.5-25 SUPPOSITORY RECTAL EVERY 4 HOURS PRN
Status: DISCONTINUED | OUTPATIENT
Start: 2018-01-14 | End: 2018-01-17 | Stop reason: HOSPADM

## 2018-01-14 RX ORDER — LOSARTAN POTASSIUM 25 MG/1
25 TABLET ORAL
Status: DISCONTINUED | OUTPATIENT
Start: 2018-01-15 | End: 2018-01-17

## 2018-01-14 RX ORDER — PROMETHAZINE HYDROCHLORIDE 25 MG/1
12.5-25 TABLET ORAL EVERY 4 HOURS PRN
Status: DISCONTINUED | OUTPATIENT
Start: 2018-01-14 | End: 2018-01-17 | Stop reason: HOSPADM

## 2018-01-14 RX ORDER — LISINOPRIL 5 MG/1
5 TABLET ORAL DAILY
Status: ON HOLD | COMMUNITY
End: 2018-01-17

## 2018-01-14 RX ORDER — LORAZEPAM 2 MG/ML
1 INJECTION INTRAMUSCULAR ONCE
Status: COMPLETED | OUTPATIENT
Start: 2018-01-14 | End: 2018-01-14

## 2018-01-14 RX ORDER — POTASSIUM CHLORIDE 7.45 MG/ML
10 INJECTION INTRAVENOUS ONCE
Status: DISCONTINUED | OUTPATIENT
Start: 2018-01-14 | End: 2018-01-14

## 2018-01-14 RX ORDER — LABETALOL HYDROCHLORIDE 5 MG/ML
10 INJECTION, SOLUTION INTRAVENOUS EVERY 4 HOURS PRN
Status: DISCONTINUED | OUTPATIENT
Start: 2018-01-14 | End: 2018-01-17 | Stop reason: HOSPADM

## 2018-01-14 RX ORDER — ONDANSETRON 4 MG/1
4 TABLET, ORALLY DISINTEGRATING ORAL EVERY 4 HOURS PRN
Status: DISCONTINUED | OUTPATIENT
Start: 2018-01-14 | End: 2018-01-17 | Stop reason: HOSPADM

## 2018-01-14 RX ORDER — OXYCODONE HYDROCHLORIDE 5 MG/1
2.5 TABLET ORAL
Status: DISCONTINUED | OUTPATIENT
Start: 2018-01-14 | End: 2018-01-17 | Stop reason: HOSPADM

## 2018-01-14 RX ORDER — METOPROLOL TARTRATE 1 MG/ML
5 INJECTION, SOLUTION INTRAVENOUS ONCE
Status: COMPLETED | OUTPATIENT
Start: 2018-01-14 | End: 2018-01-14

## 2018-01-14 RX ORDER — BISACODYL 10 MG
10 SUPPOSITORY, RECTAL RECTAL
Status: DISCONTINUED | OUTPATIENT
Start: 2018-01-14 | End: 2018-01-17

## 2018-01-14 RX ORDER — SODIUM CHLORIDE AND POTASSIUM CHLORIDE 150; 900 MG/100ML; MG/100ML
INJECTION, SOLUTION INTRAVENOUS CONTINUOUS
Status: DISCONTINUED | OUTPATIENT
Start: 2018-01-14 | End: 2018-01-17 | Stop reason: HOSPADM

## 2018-01-14 RX ORDER — MAGNESIUM SULFATE HEPTAHYDRATE 40 MG/ML
2 INJECTION, SOLUTION INTRAVENOUS ONCE
Status: COMPLETED | OUTPATIENT
Start: 2018-01-14 | End: 2018-01-14

## 2018-01-14 RX ORDER — POTASSIUM CHLORIDE 20 MEQ/1
20 TABLET, EXTENDED RELEASE ORAL DAILY
Status: DISCONTINUED | OUTPATIENT
Start: 2018-01-14 | End: 2018-01-17 | Stop reason: HOSPADM

## 2018-01-14 RX ORDER — ACETAMINOPHEN 325 MG/1
650 TABLET ORAL EVERY 6 HOURS PRN
Status: DISCONTINUED | OUTPATIENT
Start: 2018-01-14 | End: 2018-01-17 | Stop reason: HOSPADM

## 2018-01-14 RX ORDER — LISINOPRIL 5 MG/1
5 TABLET ORAL DAILY
Status: DISCONTINUED | OUTPATIENT
Start: 2018-01-15 | End: 2018-01-14

## 2018-01-14 RX ORDER — MORPHINE SULFATE 4 MG/ML
2 INJECTION, SOLUTION INTRAMUSCULAR; INTRAVENOUS
Status: DISCONTINUED | OUTPATIENT
Start: 2018-01-14 | End: 2018-01-17 | Stop reason: HOSPADM

## 2018-01-14 RX ORDER — POTASSIUM CHLORIDE 7.45 MG/ML
10 INJECTION INTRAVENOUS
Status: DISCONTINUED | OUTPATIENT
Start: 2018-01-14 | End: 2018-01-14

## 2018-01-14 RX ORDER — OXYCODONE HYDROCHLORIDE 5 MG/1
5 TABLET ORAL
Status: DISCONTINUED | OUTPATIENT
Start: 2018-01-14 | End: 2018-01-17 | Stop reason: HOSPADM

## 2018-01-14 RX ORDER — ENALAPRILAT 1.25 MG/ML
1.25 INJECTION INTRAVENOUS EVERY 6 HOURS PRN
Status: DISCONTINUED | OUTPATIENT
Start: 2018-01-14 | End: 2018-01-14

## 2018-01-14 RX ADMIN — SODIUM CHLORIDE 1000 ML: 9 INJECTION, SOLUTION INTRAVENOUS at 13:57

## 2018-01-14 RX ADMIN — POTASSIUM CHLORIDE 20 MEQ: 2 INJECTION, SOLUTION, CONCENTRATE INTRAVENOUS at 16:49

## 2018-01-14 RX ADMIN — METOPROLOL TARTRATE 5 MG: 5 INJECTION, SOLUTION INTRAVENOUS at 16:48

## 2018-01-14 RX ADMIN — MORPHINE SULFATE 2 MG: 4 INJECTION INTRAVENOUS at 18:17

## 2018-01-14 RX ADMIN — Medication 1 TABLET: at 21:21

## 2018-01-14 RX ADMIN — IOHEXOL 75 ML: 350 INJECTION, SOLUTION INTRAVENOUS at 15:17

## 2018-01-14 RX ADMIN — APIXABAN 5 MG: 5 TABLET, FILM COATED ORAL at 21:21

## 2018-01-14 RX ADMIN — MAGNESIUM SULFATE IN WATER 2 G: 40 INJECTION, SOLUTION INTRAVENOUS at 18:24

## 2018-01-14 RX ADMIN — POTASSIUM CHLORIDE AND SODIUM CHLORIDE: 900; 150 INJECTION, SOLUTION INTRAVENOUS at 18:18

## 2018-01-14 RX ADMIN — MAGNESIUM GLUCONATE 500 MG ORAL TABLET 400 MG: 500 TABLET ORAL at 21:21

## 2018-01-14 RX ADMIN — ONDANSETRON 4 MG: 4 TABLET, ORALLY DISINTEGRATING ORAL at 18:17

## 2018-01-14 RX ADMIN — POTASSIUM CHLORIDE 20 MEQ: 1500 TABLET, EXTENDED RELEASE ORAL at 16:48

## 2018-01-14 RX ADMIN — LORAZEPAM 1 MG: 2 INJECTION INTRAMUSCULAR; INTRAVENOUS at 13:56

## 2018-01-14 RX ADMIN — SODIUM CHLORIDE 1000 ML: 9 INJECTION, SOLUTION INTRAVENOUS at 21:21

## 2018-01-14 ASSESSMENT — ENCOUNTER SYMPTOMS
FOCAL WEAKNESS: 0
NAUSEA: 0
COUGH: 1
DIARRHEA: 0
SHORTNESS OF BREATH: 0
DIARRHEA: 1
BLURRED VISION: 0
WHEEZING: 0
RIGHT EYE: 0
NAUSEA: 1
WEAKNESS: 0
HEARTBURN: 0
VOMITING: 1
ABDOMINAL PAIN: 0
FEVER: 0
PALPITATIONS: 1
FEVER: 1
VOMITING: 0
LEFT EYE: 0
COUGH: 0
CHILLS: 0
SORE THROAT: 0
DIZZINESS: 0

## 2018-01-14 ASSESSMENT — CHA2DS2 SCORE
CHF OR LEFT VENTRICULAR DYSFUNCTION: NO
VASCULAR DISEASE: NO
HYPERTENSION: NO
CHA2DS2 VASC SCORE: 0
AGE 65 TO 74: NO
SEX: MALE
DIABETES: NO
AGE 75 OR GREATER: NO
PRIOR STROKE OR TIA OR THROMBOEMBOLISM: NO

## 2018-01-14 ASSESSMENT — PAIN SCALES - GENERAL
PAINLEVEL_OUTOF10: 0
PAINLEVEL_OUTOF10: 2
PAINLEVEL_OUTOF10: 6
PAINLEVEL_OUTOF10: 0

## 2018-01-14 ASSESSMENT — PATIENT HEALTH QUESTIONNAIRE - PHQ9
2. FEELING DOWN, DEPRESSED, IRRITABLE, OR HOPELESS: NOT AT ALL
7. TROUBLE CONCENTRATING ON THINGS, SUCH AS READING THE NEWSPAPER OR WATCHING TELEVISION: NOT AT ALL
4. FEELING TIRED OR HAVING LITTLE ENERGY: NEARLY EVERY DAY
9. THOUGHTS THAT YOU WOULD BE BETTER OFF DEAD, OR OF HURTING YOURSELF: NOT AT ALL
SUM OF ALL RESPONSES TO PHQ QUESTIONS 1-9: 7
6. FEELING BAD ABOUT YOURSELF - OR THAT YOU ARE A FAILURE OR HAVE LET YOURSELF OR YOUR FAMILY DOWN: NOT AL ALL
1. LITTLE INTEREST OR PLEASURE IN DOING THINGS: SEVERAL DAYS
5. POOR APPETITE OR OVEREATING: NEARLY EVERY DAY
SUM OF ALL RESPONSES TO PHQ9 QUESTIONS 1 AND 2: 1
3. TROUBLE FALLING OR STAYING ASLEEP OR SLEEPING TOO MUCH: NOT AT ALL
8. MOVING OR SPEAKING SO SLOWLY THAT OTHER PEOPLE COULD HAVE NOTICED. OR THE OPPOSITE, BEING SO FIGETY OR RESTLESS THAT YOU HAVE BEEN MOVING AROUND A LOT MORE THAN USUAL: NOT AT ALL

## 2018-01-14 ASSESSMENT — COPD QUESTIONNAIRES
COPD SCREENING SCORE: 0
HAVE YOU SMOKED AT LEAST 100 CIGARETTES IN YOUR ENTIRE LIFE: NO/DON'T KNOW
DURING THE PAST 4 WEEKS HOW MUCH DID YOU FEEL SHORT OF BREATH: NONE/LITTLE OF THE TIME
DO YOU EVER COUGH UP ANY MUCUS OR PHLEGM?: NO/ONLY WITH OCCASIONAL COLDS OR INFECTIONS

## 2018-01-14 ASSESSMENT — LIFESTYLE VARIABLES
TOTAL SCORE: 0
EVER FELT BAD OR GUILTY ABOUT YOUR DRINKING: NO
ALCOHOL_USE: YES
DO YOU DRINK ALCOHOL: NO
HOW MANY TIMES IN THE PAST YEAR HAVE YOU HAD 5 OR MORE DRINKS IN A DAY: 0
EVER_SMOKED: NEVER
HAVE YOU EVER FELT YOU SHOULD CUT DOWN ON YOUR DRINKING: NO
AVERAGE NUMBER OF DAYS PER WEEK YOU HAVE A DRINK CONTAINING ALCOHOL: 1
TOTAL SCORE: 0
HAVE PEOPLE ANNOYED YOU BY CRITICIZING YOUR DRINKING: NO
ON A TYPICAL DAY WHEN YOU DRINK ALCOHOL HOW MANY DRINKS DO YOU HAVE: 2
EVER HAD A DRINK FIRST THING IN THE MORNING TO STEADY YOUR NERVES TO GET RID OF A HANGOVER: NO
TOTAL SCORE: 0
CONSUMPTION TOTAL: NEGATIVE

## 2018-01-14 NOTE — ED NOTES
Pt up in [place], very anxious, shaking.  States he is in a fib and was told to get seen immed for a fib, hx a fib.  EKG shows tachy rate.  Denies shortness of breath.  Friend is angry pt has not been placed in room,  Pt reassured, VS taken.  Continues tachycardic, Charge RN notified.

## 2018-01-14 NOTE — ED NOTES
Chief Complaint   Patient presents with   • Palpitations     Patient states he feels like he is in AFib, states recently here for an ablation. EKG completed.

## 2018-01-14 NOTE — ED NOTES
Pt amb to room, gowned, on denisha, GERARDO&Ox4,  CC palpitations and L arm numbness this AM, agree with triage note, Pt up for ERP eval

## 2018-01-14 NOTE — ED PROVIDER NOTES
ED Provider Note    CHIEF COMPLAINT  Chief Complaint   Patient presents with   • Palpitations       HPI  Elian Sanchez is a 32 y.o. male who presents to the emergency department complaining of rapid heart rate. The patient has a history of atrial fibrillation and has undergone 2 prior cardiac ablation procedures. The most recent was on January 10 of this year. The patient was seen in the emergency room on the 12th of this month with chest discomfort and he was evaluated and according to chart review had an elevated troponin of 0.36 and a follow-up value of 0.32 and this was attributed to recent cardiac ablation. The patient had an echocardiogram at that time which showed no wall motion defect. A cardiology consultation was obtained at that visit. The patient returns today stating that he feels like his heart is racing and he is worried that he is back in atrial fibrillation. He also complains of some numbness and tingling in the left arm.    REVIEW OF SYSTEMS no fever or chills no hemoptysis he has had some intermittent chest discomfort since his procedure. All other systems negative    PAST MEDICAL HISTORY  Past Medical History:   Diagnosis Date   • Arrhythmia     afib   • Bowel habit changes     diarrhea   • Breath shortness     with exertion   • Cardiac rhythm disturbance    • Dental disorder     upper left dental pain   • Hemorrhagic disorder (CMS-HCC)     nose bleeds, even before Xarelto   • Hypertension    • Pneumonia 2016    post cardiac ablation   • Snoring        FAMILY HISTORY  No family history on file.    SOCIAL HISTORY  Social History     Social History   • Marital status: Single     Spouse name: N/A   • Number of children: N/A   • Years of education: N/A     Social History Main Topics   • Smoking status: Never Smoker   • Smokeless tobacco: Never Used   • Alcohol use Yes      Comment: occasional   • Drug use:      Types: Inhaled      Comment: marijuana   • Sexual activity: Not on file     Other  "Topics Concern   • Not on file     Social History Narrative   • No narrative on file       SURGICAL HISTORY  Past Surgical History:   Procedure Laterality Date   • RECOVERY  1/20/2016    Procedure: CATH LAB A-FIB ABLATION LRG KARLIE BRAUN FARMER ;  Surgeon: [facility];  Location: SURGERY PRE-POST PROC UN[facility];  Service:    • OTHER CARDIAC SURGERY  01/16/2016    ablation   • OTHER ORTHOPEDIC SURGERY  1993    Pin place to hold joint in place.        CURRENT MEDICATIONS  Home Medications     Reviewed by Ashley Griffiths, Pharmacy Intern (Pharmacy Intern) on 01/14/18 at 1553  Med List Status: Complete   Medication Last Dose Status   apixaban (ELIQUIS) 5mg Tab 1/14/2018 Active   lisinopril (PRINIVIL) 5 MG Tab 1/14/2018 Active                ALLERGIES  No Known Allergies    PHYSICAL EXAM  VITAL SIGNS: /102   Pulse (!) 120   Temp 37.1 °C (98.8 °F)   Resp 20   Ht 1.676 m (5' 6\")   Wt 77.1 kg (170 lb)   SpO2 99%   BMI 27.44 kg/m²    Oxygen saturation is interpreted as adequate  Constitutional: Awake and verbal and slightly anxious at the time of arrival but nontoxic appearing  HENT: Mucous members are moist and throat clear  Eyes: No erythema or discharge or jaundice  Neck: Trachea midline no JVD  Cardiovascular: Regular tachycardia  Lungs: Clear and equal bilaterally with no apparent difficulty breathing  Abdomen/Back: Soft nontender nondistended no peritoneal findings  Skin: Warm and dry  Musculoskeletal: No leg edema or calf tenderness  Neurologic: Awake verbal moving all extremities without difficulty    CHART REVIEW  Chart review was conducted and summarized above in the history present illness    Laboratory  Today in the emergency department CBC shows white blood cell count of 8.4 hemoglobin and hematocrit at 16.1 patient metabolic panel shows a low potassium of 2.9 LFTs are unremarkable TSH is normal at 1.61 troponin is improved at 0.06 compared to the values from the 12th of this month. The BMP " is normal at 17    EKG interpretation  The 12-lead EKG shows a sinus tachycardia 106 bpm large voltages are noted in V3 through V4 consistent with LVH findings are similar to the cardiogram from January 12, 2018    Radiology  CT-CTA CHEST PULMONARY ARTERY W/ RECONS   Final Result      1.  There is no CT evidence of acute pulmonary embolism.   2.  There is no evidence of pneumonia or pleural effusion.   3.  There is a trace of pericardial fluid or thickening anteriorly.   4.  There are probably biapical postinflammatory lung nodules.   5.  There is borderline splenomegaly.            ECHOCARDIOGRAM COMP W/O CONT    (Results Pending)     MEDICAL DECISION MAKING and DISPOSITION  In the emergency department an IV was established patient was given intravenous fluids and initially intravenous Ativan and he is very relaxed at this point in time however he remains tachycardic. The patient was given intravenous supplemental potassium and I have ordered 5 mg of intravenous metoprolol. I have reviewed the case with Dr. Herbert who will provide cardiology consultation and I have reviewed the case with the hospitalist and the patient is admitted to the hospitalist service for further evaluation and treatment    IMPRESSION  1. Tachycardia  2. Hypokalemia  3. Hypertension         Electronically signed by: Edwin Simpson, 1/14/2018 3:54 PM

## 2018-01-14 NOTE — CONSULTS
Cardiology Initial Consult Note    Date of note:    1/14/2018      Consulting Physician: Edwin Simpson M.D.    Patient ID:    Name:   Elian Sanchez   YOB: 1985  Age:   32 y.o.  male   MRN:   [id number]      Reason for Consultation: tachycardia    HPI:  Elian Sanchez is a 32 y.o.-year-old male with a history of hypertension and atrial fibrillation s/p radiofrequency ablation x 2 (most recently 1/10/2018 by Dr. Groves), who presents with tachycardia.     He reports having an episode of syncope for which he presented to the ER 2 days ago.  Work-up including echocardiogram was normal.  Since then, he has general not felt well and had multiple episodes when his entire body feels numb associated with sweating and palpitations.  He has a chronic dry cough.  He has bene fatigued.  Today he reports he hasn't eaten or had anything to drink.    No recent medication changes, no sick contact.     Patient denies chest pain, dyspnea on exertion, pre-syncope, syncope, lower extremity swelling, orthopnea, PND or recent weight gain.       Review of Systems   Constitution: Positive for fever (subjective).   Eyes: Negative for vision loss in left eye and vision loss in right eye.   Respiratory: Positive for cough.    Gastrointestinal: Positive for diarrhea. Negative for abdominal pain, nausea and vomiting.   Genitourinary: Negative for hematuria.   Neurological: Negative for focal weakness.        All others reviewed and negative.      Past Medical History:   Diagnosis Date   • Arrhythmia     afib   • Bowel habit changes     diarrhea   • Breath shortness     with exertion   • Cardiac rhythm disturbance    • Dental disorder     upper left dental pain   • Hemorrhagic disorder (CMS-HCC)     nose bleeds, even before Xarelto   • Hypertension    • Pneumonia 2016    post cardiac ablation   • Snoring        Past Surgical History:   Procedure Laterality Date   • RECOVERY  1/20/2016    Procedure: CATH LAB A-FIB ABLATION  "PATRICIA FARMER ;  Surgeon: [facility] Surgery;  Location: SURGERY PRE-POST PROC UNIT [facility] [place];  Service:    • OTHER CARDIAC SURGERY  01/16/2016    ablation   • OTHER ORTHOPEDIC SURGERY  1993    Pin place to hold joint in place.          Prescriptions Prior to Admission   Medication Sig Dispense Refill Last Dose   • lisinopril (PRINIVIL) 5 MG Tab Take 5 mg by mouth every day.   1/14/2018 at am   • apixaban (ELIQUIS) 5mg Tab Take 1 Tab by mouth 2 Times a Day. 60 Tab 3 1/14/2018 at am         No Known Allergies      No family history on file.  Fh: reviewed, no SCD.     Social History     Social History   • Marital status: Single     Spouse name: N/A   • Number of children: N/A   • Years of education: N/A     Occupational History   • Not on file.     Social History Main Topics   • Smoking status: Never Smoker   • Smokeless tobacco: Never Used   • Alcohol use Yes      Comment: occasional   • Drug use:      Types: Inhaled      Comment: marijuana   • Sexual activity: Not on file     Other Topics Concern   • Not on file     Social History Narrative   • No narrative on file         Physical Exam  Body mass index is 27.44 kg/m².  Blood pressure 151/102, pulse (!) 120, temperature 37.1 °C (98.8 °F), resp. rate 20, height 1.676 m (5' 6\"), weight 77.1 kg (170 lb), SpO2 99 %.  Vitals:    01/14/18 1118 01/14/18 1245   BP: 158/103 151/102   Pulse: (!) 128 (!) 120   Resp: (!) 26 20   Temp: 37.1 °C (98.8 °F)    SpO2: 99%    Weight: 77.1 kg (170 lb)    Height: 1.676 m (5' 6\")      Oxygen Therapy:  Pulse Oximetry: 99 %, O2 Delivery: None (Room Air)    General: Well appearing and in no apparent distress  Eyes: nl conjunctiva  ENT: OP clear  Neck: JVP 4-5 cm H2O, no carotid bruits  Lungs: normal respiratory effort, CTAB  Heart: tachycardic,  no murmurs, no rubs or gallops, no edema bilateral lower extremities. No LV/RV heave on cardiac palpatation. 2+ bilateral radial pulses.  2+ bilateral dp pulses.   Abdomen: soft, non tender, non " distended, no masses, normal bowel sounds.  No HSM.  Extremities/MSK: no clubbing, no cyanosis.  Both groin entry sites are without hematoma.  Mild ecchymoses on the right.   Neurological: No focal sensory deficits  Psychiatric: Appropriate affect, A/O x 3  Skin: Warm extremities        Labs (personally reviewed and notable for):   Potassium 2.9  Trop 0.06      Cardiac Imaging and Procedures Review:    EKG dated 1/14/18: My personal interpretation is sinus tachycardia, borderline repolarization abnormality.     Echo dated 1/12/2018:   CONCLUSIONS  Normal transthoracic echocardiogram.   Compared to the images of the prior study done 7- -  there has   been no significant change.    Repeat echo 1/14/18: reviewed by me, no effusion, normal biventricular function.     Ablation 1/10/2018    PROCEDURAL COMPONENTS:  1.  EP study with AFib ablation and isolating the pulmonary veins.  2.  Guidance 3 dimensional mapping.  3.  Guidance with intracardiac echocardiography.  4.  Additional linear ablation of the cavotricuspid isthmus.  5.  Programmed stimulation after drug infusion.    CONCLUSION:  1.  Identification of the reconnected the right superior pulmonary vein.  2.  Successful ablation and isolation of the right superior pulmonary vein.  3.  Adenosine administration with stimulation showed that this appeared to   remain blocked.  4.  No inducible tachycardia on high-dose isoproterenol.  5.  Successful cavotricuspid isthmus line with bidirectional block.  6.  Normal conduction intervals with no other inducible tachycardia.    Radiology test Review:  CXR: 1/12/18  FINDINGS:  Cardiomediastinal contour is within normal limits.  No focal pulmonary consolidation.  No pleural fluid collection or pneumothorax.  No major bony abnormality is seen.   Impression       No acute cardiopulmonary disease.          Chest CTA:  1.  There is no CT evidence of acute pulmonary embolism.  2.  There is no evidence of pneumonia or pleural  effusion.  3.  There is a trace of pericardial fluid or thickening anteriorly.  4.  There are probably biapical postinflammatory lung nodules.  5.  There is borderline splenomegaly.      Impression and Medical Decision Making:  # tachycardia, likely secondary to dehydration  # Generalized malaise  # Diarrhea  # hypokalemia, likely secondary to diarrhea  # atrial fibrillation s/p recent ablation 1/10/18, no recurrence of arrythmia, and no complications seen on echocardiographic imaging today.     Recommendations:  # fluid repletion, will give 1L now  # work-up of diarrhea, consider blood culture, sputum, urinalysis.   # continue apixaban  # switch lisinopril to losartan 25mg PO daily as lisinopril may be causing cough.    # replete lytes    Thank you for allowing me to participate in the care of this patient, Cardiology will sign off.  Please contact me with any questions.      Neal Herbert MD  Cardiologist, [facility] [place]   [phone]

## 2018-01-15 LAB
AMPHET UR QL SCN: NEGATIVE
AMYLASE SERPL-CCNC: 47 U/L (ref 20–103)
ANION GAP SERPL CALC-SCNC: 7 MMOL/L (ref 0–11.9)
APPEARANCE UR: ABNORMAL
BACTERIA #/AREA URNS HPF: NEGATIVE /HPF
BARBITURATES UR QL SCN: NEGATIVE
BASOPHILS # BLD AUTO: 0.6 % (ref 0–1.8)
BASOPHILS # BLD: 0.05 K/UL (ref 0–0.12)
BENZODIAZ UR QL SCN: NEGATIVE
BILIRUB UR QL STRIP.AUTO: NEGATIVE
BUN SERPL-MCNC: 7 MG/DL (ref 8–22)
BZE UR QL SCN: NEGATIVE
CALCIUM SERPL-MCNC: 8.3 MG/DL (ref 8.5–10.5)
CANNABINOIDS UR QL SCN: POSITIVE
CHLORIDE SERPL-SCNC: 106 MMOL/L (ref 96–112)
CO2 SERPL-SCNC: 23 MMOL/L (ref 20–33)
COLOR UR: YELLOW
CORTIS SERPL-MCNC: 17.7 UG/DL (ref 0–23)
CREAT SERPL-MCNC: 0.87 MG/DL (ref 0.5–1.4)
EOSINOPHIL # BLD AUTO: 0.02 K/UL (ref 0–0.51)
EOSINOPHIL NFR BLD: 0.2 % (ref 0–6.9)
EPI CELLS #/AREA URNS HPF: NEGATIVE /HPF
ERYTHROCYTE [DISTWIDTH] IN BLOOD BY AUTOMATED COUNT: 36.2 FL (ref 35.9–50)
GLUCOSE SERPL-MCNC: 93 MG/DL (ref 65–99)
GLUCOSE UR STRIP.AUTO-MCNC: NEGATIVE MG/DL
HCT VFR BLD AUTO: 41.5 % (ref 42–52)
HGB BLD-MCNC: 14.3 G/DL (ref 14–18)
HYALINE CASTS #/AREA URNS LPF: ABNORMAL /LPF
IMM GRANULOCYTES # BLD AUTO: 0.03 K/UL (ref 0–0.11)
IMM GRANULOCYTES NFR BLD AUTO: 0.4 % (ref 0–0.9)
KETONES UR STRIP.AUTO-MCNC: ABNORMAL MG/DL
LEUKOCYTE ESTERASE UR QL STRIP.AUTO: NEGATIVE
LIPASE SERPL-CCNC: 47 U/L (ref 11–82)
LYMPHOCYTES # BLD AUTO: 1.1 K/UL (ref 1–4.8)
LYMPHOCYTES NFR BLD: 13.3 % (ref 22–41)
MAGNESIUM SERPL-MCNC: 2.2 MG/DL (ref 1.5–2.5)
MCH RBC QN AUTO: 27.7 PG (ref 27–33)
MCHC RBC AUTO-ENTMCNC: 34.5 G/DL (ref 33.7–35.3)
MCV RBC AUTO: 80.3 FL (ref 81.4–97.8)
METHADONE UR QL SCN: NEGATIVE
MICRO URNS: ABNORMAL
MONOCYTES # BLD AUTO: 0.86 K/UL (ref 0–0.85)
MONOCYTES NFR BLD AUTO: 10.4 % (ref 0–13.4)
NEUTROPHILS # BLD AUTO: 6.18 K/UL (ref 1.82–7.42)
NEUTROPHILS NFR BLD: 75.1 % (ref 44–72)
NITRITE UR QL STRIP.AUTO: NEGATIVE
NRBC # BLD AUTO: 0 K/UL
NRBC BLD-RTO: 0 /100 WBC
OPIATES UR QL SCN: NEGATIVE
OXYCODONE UR QL SCN: POSITIVE
PCP UR QL SCN: NEGATIVE
PH UR STRIP.AUTO: 6.5 [PH]
PLATELET # BLD AUTO: 215 K/UL (ref 164–446)
PMV BLD AUTO: 9.5 FL (ref 9–12.9)
POTASSIUM SERPL-SCNC: 3.4 MMOL/L (ref 3.6–5.5)
PROPOXYPH UR QL SCN: NEGATIVE
PROT UR QL STRIP: NEGATIVE MG/DL
RBC # BLD AUTO: 5.17 M/UL (ref 4.7–6.1)
RBC # URNS HPF: ABNORMAL /HPF
RBC UR QL AUTO: NEGATIVE
SODIUM SERPL-SCNC: 136 MMOL/L (ref 135–145)
SP GR UR STRIP.AUTO: 1.03
TROPONIN I SERPL-MCNC: 0.02 NG/ML (ref 0–0.04)
TROPONIN I SERPL-MCNC: 0.03 NG/ML (ref 0–0.04)
TROPONIN I SERPL-MCNC: 0.04 NG/ML (ref 0–0.04)
UROBILINOGEN UR STRIP.AUTO-MCNC: 1 MG/DL
WBC # BLD AUTO: 8.2 K/UL (ref 4.8–10.8)
WBC #/AREA URNS HPF: ABNORMAL /HPF

## 2018-01-15 PROCEDURE — 83690 ASSAY OF LIPASE: CPT

## 2018-01-15 PROCEDURE — 84484 ASSAY OF TROPONIN QUANT: CPT

## 2018-01-15 PROCEDURE — 85025 COMPLETE CBC W/AUTO DIFF WBC: CPT

## 2018-01-15 PROCEDURE — 80048 BASIC METABOLIC PNL TOTAL CA: CPT

## 2018-01-15 PROCEDURE — 82533 TOTAL CORTISOL: CPT

## 2018-01-15 PROCEDURE — 99233 SBSQ HOSP IP/OBS HIGH 50: CPT | Performed by: HOSPITALIST

## 2018-01-15 PROCEDURE — 700102 HCHG RX REV CODE 250 W/ 637 OVERRIDE(OP): Performed by: FAMILY MEDICINE

## 2018-01-15 PROCEDURE — 770020 HCHG ROOM/CARE - TELE (206)

## 2018-01-15 PROCEDURE — 36415 COLL VENOUS BLD VENIPUNCTURE: CPT

## 2018-01-15 PROCEDURE — 82150 ASSAY OF AMYLASE: CPT

## 2018-01-15 PROCEDURE — 700101 HCHG RX REV CODE 250: Performed by: FAMILY MEDICINE

## 2018-01-15 PROCEDURE — 83735 ASSAY OF MAGNESIUM: CPT

## 2018-01-15 PROCEDURE — 81001 URINALYSIS AUTO W/SCOPE: CPT

## 2018-01-15 PROCEDURE — 700102 HCHG RX REV CODE 250 W/ 637 OVERRIDE(OP): Performed by: INTERNAL MEDICINE

## 2018-01-15 PROCEDURE — 80307 DRUG TEST PRSMV CHEM ANLYZR: CPT

## 2018-01-15 PROCEDURE — 82024 ASSAY OF ACTH: CPT

## 2018-01-15 PROCEDURE — A9270 NON-COVERED ITEM OR SERVICE: HCPCS | Performed by: INTERNAL MEDICINE

## 2018-01-15 PROCEDURE — A9270 NON-COVERED ITEM OR SERVICE: HCPCS | Performed by: FAMILY MEDICINE

## 2018-01-15 RX ADMIN — Medication 1 TABLET: at 19:42

## 2018-01-15 RX ADMIN — LOSARTAN POTASSIUM 25 MG: 25 TABLET, FILM COATED ORAL at 09:24

## 2018-01-15 RX ADMIN — POTASSIUM CHLORIDE AND SODIUM CHLORIDE: 900; 150 INJECTION, SOLUTION INTRAVENOUS at 23:50

## 2018-01-15 RX ADMIN — MAGNESIUM GLUCONATE 500 MG ORAL TABLET 400 MG: 500 TABLET ORAL at 09:25

## 2018-01-15 RX ADMIN — POTASSIUM CHLORIDE 20 MEQ: 1500 TABLET, EXTENDED RELEASE ORAL at 09:24

## 2018-01-15 RX ADMIN — APIXABAN 5 MG: 5 TABLET, FILM COATED ORAL at 09:24

## 2018-01-15 RX ADMIN — APIXABAN 5 MG: 5 TABLET, FILM COATED ORAL at 19:42

## 2018-01-15 RX ADMIN — Medication 1 TABLET: at 09:26

## 2018-01-15 RX ADMIN — POTASSIUM CHLORIDE AND SODIUM CHLORIDE: 900; 150 INJECTION, SOLUTION INTRAVENOUS at 13:16

## 2018-01-15 RX ADMIN — OXYCODONE HYDROCHLORIDE 5 MG: 5 TABLET ORAL at 19:42

## 2018-01-15 RX ADMIN — OXYCODONE HYDROCHLORIDE 5 MG: 5 TABLET ORAL at 23:50

## 2018-01-15 RX ADMIN — MAGNESIUM GLUCONATE 500 MG ORAL TABLET 400 MG: 500 TABLET ORAL at 19:42

## 2018-01-15 RX ADMIN — OXYCODONE HYDROCHLORIDE 5 MG: 5 TABLET ORAL at 16:15

## 2018-01-15 ASSESSMENT — PAIN SCALES - GENERAL
PAINLEVEL_OUTOF10: 3
PAINLEVEL_OUTOF10: 10
PAINLEVEL_OUTOF10: 0
PAINLEVEL_OUTOF10: 8
PAINLEVEL_OUTOF10: 7
PAINLEVEL_OUTOF10: 8
PAINLEVEL_OUTOF10: 0

## 2018-01-15 ASSESSMENT — ENCOUNTER SYMPTOMS
WEAKNESS: 0
NAUSEA: 1
PALPITATIONS: 0
POLYDIPSIA: 0
RESPIRATORY NEGATIVE: 1
BACK PAIN: 0
FEVER: 0
BRUISES/BLEEDS EASILY: 0
VOMITING: 1
CONSTITUTIONAL NEGATIVE: 1
NERVOUS/ANXIOUS: 1
MUSCULOSKELETAL NEGATIVE: 1
EYES NEGATIVE: 1
FOCAL WEAKNESS: 0
HEADACHES: 0
COUGH: 0
HEARTBURN: 0
NEUROLOGICAL NEGATIVE: 1
DEPRESSION: 0
NECK PAIN: 0
CHILLS: 0
DIZZINESS: 0

## 2018-01-15 ASSESSMENT — LIFESTYLE VARIABLES: SUBSTANCE_ABUSE: 1

## 2018-01-15 NOTE — PROGRESS NOTES
"Patient admitted in stable condition. Complaining of heart racing. Complains of pain in right groin, morphine given. Cardiac monitor hooked up. Patient in sinus tachy low 100s. 0.16/0.10/0.36    Will continue to monitor.     Blood pressure 158/96, pulse (!) 103, temperature 37.2 °C (98.9 °F), resp. rate 20, height 1.676 m (5' 6\"), weight 77 kg (169 lb 12.1 oz), SpO2 97 %.      "

## 2018-01-15 NOTE — PROGRESS NOTES
Report received, assumed pt care, assessment complete. VSS, A&O X4, states pain in R groin 5/10 on pain scale w/ambulation and down to 1-2/10 @rest, denies Chest discomfort. Discussed POC, pt verbalizes understanding. No further concerns at this time. Bed in low position, call light in reach.   ECHO pending.

## 2018-01-15 NOTE — PROGRESS NOTES
2 RN skin check. This nurse and the oncoming night nurse did a head to toe skin check. Patient has many tattoos over arms.     Patient has:  1. purple bruising and slight edema in right groin that is painful to the touch.     2. purple bruising in the right wrist from a prior ablation that is not painful to the touch.    3. a puncture in right carotid form a prior ablation attempt.     4. a left groin site from a prior ablation attempt that is not purple or painful to the site.

## 2018-01-15 NOTE — PROGRESS NOTES
Pt observed. Pt doesn't c/o pain at this time  Pt AAOx4, no other signs or symptoms of distress, fall precautions in place, call light within reach, all questions answered, will continue to monitor.

## 2018-01-15 NOTE — H&P
Hospital Medicine History and Physical    Date of Service  1/14/2018    Chief Complaint  Chief Complaint   Patient presents with   • Palpitations       History of Presenting Illness  32 y.o. male who presented 1/14/2018 with palpitations and chest pressure which started this morning. He also had an episode of nausea or vomiting. Patient with history of recurrent atrial fibrillation. He had recent radiofrequency ablation done about a week ago. He thought he had recurrence of his atrial fibrillation again. His EKG here shows sinus tachycardia. His rate is in the 110 to 120s. Cardiology has been consulted. CT scan of the chest was negative for pulmonary embolism. Echocardiogram was ordered by cardiology. He denies any fever or chills, cough or congestion, abdominal pain. He does have multiple electrolyte issues.   Primary Care Physician  Pcp Pt States None    Consultants  Cardiology - [place]    Code Status  Full    Review of Systems  Review of Systems   Constitutional: Negative for chills, fever and malaise/fatigue.   HENT: Negative for hearing loss and sore throat.    Eyes: Negative for blurred vision.   Respiratory: Negative for cough, shortness of breath and wheezing.    Cardiovascular: Positive for chest pain and palpitations. Negative for leg swelling.   Gastrointestinal: Positive for nausea and vomiting. Negative for abdominal pain, diarrhea and heartburn.   Genitourinary: Negative for dysuria.   Neurological: Negative for dizziness and weakness.        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        Medications  No current facility-administered medications on file prior to encounter.      Current Outpatient Prescriptions on File Prior to Encounter   Medication Sig Dispense Refill   • apixaban (ELIQUIS) 5mg Tab Take 1 Tab by mouth 2 Times a Day. 60 Tab 3       Family History  No family history on file.    Social History  Social History   Substance Use Topics   • Smoking status: Never Smoker   • Smokeless tobacco: Never Used   • Alcohol use Yes      Comment: occasional       Allergies  No Known Allergies     Physical Exam  Laboratory   Hemodynamics  Temp (24hrs), Av.2 °C (98.9 °F), Min:37.1 °C (98.8 °F), Max:37.2 °C (98.9 °F)   Temperature: 37.2 °C (98.9 °F)  Pulse  Av.7  Min: 103  Max: 128    Blood Pressure: 158/96      Respiratory      Respiration: 20, Pulse Oximetry: 97 %        RUL Breath Sounds: Clear, RML Breath Sounds: Clear, RLL Breath Sounds: Clear, LEOBARDO Breath Sounds: Clear, LLL Breath Sounds: Clear    Physical Exam   Constitutional: He is oriented to person, place, and time. He appears well-developed and well-nourished.   HENT:   Head: Normocephalic and atraumatic.   Eyes: Conjunctivae are normal. Pupils are equal, round, and reactive to light.   Neck: No tracheal deviation present. No thyromegaly present.   Cardiovascular: Regular rhythm.  Tachycardia present.    Pulmonary/Chest: Effort normal and breath sounds normal.   Abdominal: Soft. Bowel sounds are normal. He exhibits no distension. There is no tenderness.   Musculoskeletal: He exhibits no edema.   Lymphadenopathy:     He has no cervical adenopathy.   Neurological: He is alert and oriented to person, place, and time.   Skin:   bruising at right  groin   Nursing note and vitals reviewed.      Recent Labs      01/12/18   1209  01/14/18   1330   WBC  10.1  8.4   RBC  5.63  5.82   HEMOGLOBIN  15.6  16.1   HEMATOCRIT  45.4  46.0   MCV  80.6*  79.0*   MCH  27.7  27.7   MCHC  34.4  35.0   RDW  37.0  35.1*   PLATELETCT  252  257   MPV  9.6  9.7     Recent Labs      01/12/18   1209  01/14/18   1330   SODIUM  139  138   POTASSIUM  3.6  2.9*   CHLORIDE  105  104   CO2  27  25   GLUCOSE  89  83   BUN  16  11   CREATININE  0.88  0.86   CALCIUM  9.2  9.7     Recent Labs      01/12/18   1209  01/14/18   1330   ALTSGPT  20  21   ASTSGOT  15  17   ALKPHOSPHAT  51  56   TBILIRUBIN  0.7  0.7   GLUCOSE  89  83     Recent Labs      01/12/18   1209  01/14/18   1330   APTT  26.7  33.6   INR  1.12  1.08     Recent Labs      01/14/18   1330   BNPBTYPENAT  17         Lab Results   Component Value Date    TROPONINI 0.06 (H) 01/14/2018     Urinalysis:    Lab Results  Component Value Date/Time   SPECGRAVITY 1.011 01/22/2016 0040   GLUCOSEUR Negative 01/22/2016 0040   KETONES Negative 01/22/2016 0040   NITRITE Negative 01/22/2016 0040        Imaging    CT PE  There is no CT evidence of acute pulmonary embolism.  There is no evidence of pneumonia or pleural effusion.  There is a trace of pericardial fluid or thickening anteriorly.   Assessment/Plan     I anticipate this patient will require at least two midnights for appropriate medical management, necessitating inpatient admission.    * Tachycardia- (present on admission)   Assessment & Plan    Cardiology following        HTN (hypertension)- (present on admission)   Assessment & Plan    Losartan        Elevated troponin- (present on admission)   Assessment & Plan    Serial troponin, echocardiogram done        Nausea & vomiting- (present on admission)   Assessment & Plan    IV fluid hydration        Hypophosphatemia- (present on admission)   Assessment & Plan    K-Phos        Hypomagnesemia- (present on admission)   Assessment & Plan     IV and oral magnesium, follow levels        Hypokalemia- (present on admission)   Assessment & Plan    IV KCl and K Dur, follow BMP        History of atrial fibrillation- (present on admission)   Assessment & Plan    Continue Eliquis            VTE prophylaxis: Eliquis.

## 2018-01-16 ENCOUNTER — APPOINTMENT (OUTPATIENT)
Dept: RADIOLOGY | Facility: MEDICAL CENTER | Age: 33
DRG: 641 | End: 2018-01-16
Attending: HOSPITALIST
Payer: COMMERCIAL

## 2018-01-16 LAB
ACTH PLAS-MCNC: 31 PG/ML (ref 7–69)
ALBUMIN SERPL BCP-MCNC: 4.2 G/DL (ref 3.2–4.9)
ALBUMIN/GLOB SERPL: 1.4 G/DL
ALP SERPL-CCNC: 50 U/L (ref 30–99)
ALT SERPL-CCNC: 15 U/L (ref 2–50)
ANION GAP SERPL CALC-SCNC: 8 MMOL/L (ref 0–11.9)
AST SERPL-CCNC: 14 U/L (ref 12–45)
BASOPHILS # BLD AUTO: 0.5 % (ref 0–1.8)
BASOPHILS # BLD: 0.04 K/UL (ref 0–0.12)
BILIRUB SERPL-MCNC: 0.6 MG/DL (ref 0.1–1.5)
BUN SERPL-MCNC: 7 MG/DL (ref 8–22)
CALCIUM SERPL-MCNC: 8.5 MG/DL (ref 8.5–10.5)
CHLORIDE SERPL-SCNC: 103 MMOL/L (ref 96–112)
CO2 SERPL-SCNC: 23 MMOL/L (ref 20–33)
CREAT SERPL-MCNC: 0.79 MG/DL (ref 0.5–1.4)
EOSINOPHIL # BLD AUTO: 0.02 K/UL (ref 0–0.51)
EOSINOPHIL NFR BLD: 0.2 % (ref 0–6.9)
ERYTHROCYTE [DISTWIDTH] IN BLOOD BY AUTOMATED COUNT: 37 FL (ref 35.9–50)
GLOBULIN SER CALC-MCNC: 3 G/DL (ref 1.9–3.5)
GLUCOSE SERPL-MCNC: 87 MG/DL (ref 65–99)
HCT VFR BLD AUTO: 44.5 % (ref 42–52)
HGB BLD-MCNC: 15.5 G/DL (ref 14–18)
IMM GRANULOCYTES # BLD AUTO: 0.04 K/UL (ref 0–0.11)
IMM GRANULOCYTES NFR BLD AUTO: 0.5 % (ref 0–0.9)
LYMPHOCYTES # BLD AUTO: 1.18 K/UL (ref 1–4.8)
LYMPHOCYTES NFR BLD: 14.4 % (ref 22–41)
MAGNESIUM SERPL-MCNC: 1.8 MG/DL (ref 1.5–2.5)
MCH RBC QN AUTO: 28.3 PG (ref 27–33)
MCHC RBC AUTO-ENTMCNC: 34.8 G/DL (ref 33.7–35.3)
MCV RBC AUTO: 81.2 FL (ref 81.4–97.8)
MONOCYTES # BLD AUTO: 0.81 K/UL (ref 0–0.85)
MONOCYTES NFR BLD AUTO: 9.9 % (ref 0–13.4)
NEUTROPHILS # BLD AUTO: 6.13 K/UL (ref 1.82–7.42)
NEUTROPHILS NFR BLD: 74.5 % (ref 44–72)
NRBC # BLD AUTO: 0 K/UL
NRBC BLD-RTO: 0 /100 WBC
PHOSPHATE SERPL-MCNC: 2.7 MG/DL (ref 2.5–4.5)
PLATELET # BLD AUTO: 208 K/UL (ref 164–446)
PMV BLD AUTO: 9.6 FL (ref 9–12.9)
POTASSIUM SERPL-SCNC: 3.7 MMOL/L (ref 3.6–5.5)
PROT SERPL-MCNC: 7.2 G/DL (ref 6–8.2)
RBC # BLD AUTO: 5.48 M/UL (ref 4.7–6.1)
SODIUM SERPL-SCNC: 134 MMOL/L (ref 135–145)
WBC # BLD AUTO: 8.2 K/UL (ref 4.8–10.8)

## 2018-01-16 PROCEDURE — 80053 COMPREHEN METABOLIC PANEL: CPT

## 2018-01-16 PROCEDURE — 85025 COMPLETE CBC W/AUTO DIFF WBC: CPT

## 2018-01-16 PROCEDURE — A9502 TC99M TETROFOSMIN: HCPCS

## 2018-01-16 PROCEDURE — 36415 COLL VENOUS BLD VENIPUNCTURE: CPT

## 2018-01-16 PROCEDURE — A9270 NON-COVERED ITEM OR SERVICE: HCPCS | Performed by: FAMILY MEDICINE

## 2018-01-16 PROCEDURE — 700111 HCHG RX REV CODE 636 W/ 250 OVERRIDE (IP)

## 2018-01-16 PROCEDURE — 99232 SBSQ HOSP IP/OBS MODERATE 35: CPT | Performed by: HOSPITALIST

## 2018-01-16 PROCEDURE — 770020 HCHG ROOM/CARE - TELE (206)

## 2018-01-16 PROCEDURE — 83735 ASSAY OF MAGNESIUM: CPT

## 2018-01-16 PROCEDURE — A9270 NON-COVERED ITEM OR SERVICE: HCPCS | Performed by: INTERNAL MEDICINE

## 2018-01-16 PROCEDURE — 84100 ASSAY OF PHOSPHORUS: CPT

## 2018-01-16 PROCEDURE — 700102 HCHG RX REV CODE 250 W/ 637 OVERRIDE(OP): Performed by: FAMILY MEDICINE

## 2018-01-16 PROCEDURE — 700101 HCHG RX REV CODE 250: Performed by: FAMILY MEDICINE

## 2018-01-16 PROCEDURE — 700102 HCHG RX REV CODE 250 W/ 637 OVERRIDE(OP): Performed by: INTERNAL MEDICINE

## 2018-01-16 RX ORDER — REGADENOSON 0.08 MG/ML
INJECTION, SOLUTION INTRAVENOUS
Status: COMPLETED
Start: 2018-01-16 | End: 2018-01-16

## 2018-01-16 RX ADMIN — LOSARTAN POTASSIUM 25 MG: 25 TABLET, FILM COATED ORAL at 07:51

## 2018-01-16 RX ADMIN — OXYCODONE HYDROCHLORIDE 5 MG: 5 TABLET ORAL at 07:51

## 2018-01-16 RX ADMIN — Medication 1 TABLET: at 07:51

## 2018-01-16 RX ADMIN — POTASSIUM CHLORIDE 20 MEQ: 1500 TABLET, EXTENDED RELEASE ORAL at 07:51

## 2018-01-16 RX ADMIN — POTASSIUM CHLORIDE AND SODIUM CHLORIDE: 900; 150 INJECTION, SOLUTION INTRAVENOUS at 09:27

## 2018-01-16 RX ADMIN — MAGNESIUM GLUCONATE 500 MG ORAL TABLET 400 MG: 500 TABLET ORAL at 21:11

## 2018-01-16 RX ADMIN — STANDARDIZED SENNA CONCENTRATE AND DOCUSATE SODIUM 2 TABLET: 8.6; 5 TABLET, FILM COATED ORAL at 07:51

## 2018-01-16 RX ADMIN — OXYCODONE HYDROCHLORIDE 5 MG: 5 TABLET ORAL at 23:40

## 2018-01-16 RX ADMIN — MAGNESIUM GLUCONATE 500 MG ORAL TABLET 400 MG: 500 TABLET ORAL at 07:51

## 2018-01-16 RX ADMIN — LABETALOL HYDROCHLORIDE 10 MG: 5 INJECTION, SOLUTION INTRAVENOUS at 23:40

## 2018-01-16 RX ADMIN — Medication 1 TABLET: at 21:11

## 2018-01-16 RX ADMIN — OXYCODONE HYDROCHLORIDE 5 MG: 5 TABLET ORAL at 18:35

## 2018-01-16 RX ADMIN — APIXABAN 5 MG: 5 TABLET, FILM COATED ORAL at 07:50

## 2018-01-16 RX ADMIN — REGADENOSON 0.4 MG: 0.08 INJECTION, SOLUTION INTRAVENOUS at 12:21

## 2018-01-16 RX ADMIN — APIXABAN 5 MG: 5 TABLET, FILM COATED ORAL at 21:11

## 2018-01-16 ASSESSMENT — ENCOUNTER SYMPTOMS
HEADACHES: 0
RESPIRATORY NEGATIVE: 1
BRUISES/BLEEDS EASILY: 0
COUGH: 0
NERVOUS/ANXIOUS: 1
DEPRESSION: 0
EYES NEGATIVE: 1
BACK PAIN: 0
CONSTITUTIONAL NEGATIVE: 1
NEUROLOGICAL NEGATIVE: 1
NECK PAIN: 0
NAUSEA: 1
FOCAL WEAKNESS: 0
HEARTBURN: 0
FEVER: 0
CHILLS: 0
MUSCULOSKELETAL NEGATIVE: 1
VOMITING: 1
DIZZINESS: 0
WEAKNESS: 0
POLYDIPSIA: 0
PALPITATIONS: 0

## 2018-01-16 ASSESSMENT — PAIN SCALES - GENERAL
PAINLEVEL_OUTOF10: 4
PAINLEVEL_OUTOF10: 4
PAINLEVEL_OUTOF10: 0
PAINLEVEL_OUTOF10: 10

## 2018-01-16 ASSESSMENT — LIFESTYLE VARIABLES: SUBSTANCE_ABUSE: 1

## 2018-01-16 NOTE — PROGRESS NOTES
"Pt a&o. Ambulatory. Complaints of jaw pain due to dental issues per patient and \"needs to see dentist soon\". Oxycodone given with good pain relief. Npo after midnight. Plan for stress test in am. Verbalized understanding and agrees. Able to make needs known.   "

## 2018-01-16 NOTE — PROGRESS NOTES
[facility] Hospitalist Progress Note    Date of Service: 1/15/2018    Chief Complaint  32 y.o. male admitted 2018 with prolonged history of cardiac dysrhythmia, has seen Dr. Mora in the past, status post ablation now again, has chronic intermittent complains of weakness and tachycardia. Reports a.m. nausea vomiting almost on a daily basis with unknown source    Interval Problem Update  Patient seen and examined today.    Patient tolerating treatment and therapies.  All Data, Medication data reviewed.  Case discussed with nursing as available.  Plan of Care reviewed with patient and notified of changes.  1/15 the patient was seen briefly by cardiology and emergency room, he has not gotten any answers in terms of his tachycardia and weakness, he does have intermittent left-sided chest pain. I discussed the case with cardiology for reevaluation    Consultants/Specialty  Cardiology    Disposition  To be determined        Review of Systems   Constitutional: Negative.  Negative for chills and fever.   HENT: Negative.    Eyes: Negative.    Respiratory: Negative.  Negative for cough.    Cardiovascular: Positive for chest pain. Negative for palpitations.   Gastrointestinal: Positive for nausea and vomiting. Negative for heartburn.   Genitourinary: Negative.  Negative for dysuria and frequency.   Musculoskeletal: Negative.  Negative for back pain and neck pain.   Skin: Negative.  Negative for itching and rash.   Neurological: Negative.  Negative for dizziness, focal weakness, weakness and headaches.   Endo/Heme/Allergies: Negative.  Negative for polydipsia. Does not bruise/bleed easily.   Psychiatric/Behavioral: Positive for substance abuse. Negative for depression. The patient is nervous/anxious.       Physical Exam  Laboratory/Imaging   Hemodynamics  Temp (24hrs), Av.2 °C (98.9 °F), Min:36.2 °C (97.2 °F), Max:37.6 °C (99.7 °F)   Temperature: 37.5 °C (99.5 °F)  Pulse  Av.5  Min: 76  Max: 128    Blood Pressure:  152/95      Respiratory      Respiration: 16, Pulse Oximetry: 96 %        RUL Breath Sounds: Clear, RML Breath Sounds: Clear, RLL Breath Sounds: Clear, LEOBARDO Breath Sounds: Clear, LLL Breath Sounds: Clear    Fluids    Intake/Output Summary (Last 24 hours) at 01/15/18 1657  Last data filed at 01/15/18 1330   Gross per 24 hour   Intake              720 ml   Output                0 ml   Net              720 ml       Nutrition  Orders Placed This Encounter   Procedures   • DIET ORDER     Standing Status:   Standing     Number of Occurrences:   1     Order Specific Question:   Diet:     Answer:   Regular [1]     Order Specific Question:   Miscellaneous modifications:     Answer:   No Decaf, No Caffeine(for test) [11]     Comments:   Protocol 1313 Patient to have no caffeine for 12 hours prior to exam (decaf, coffee, cola, tea, chocolate)   • DIET NPO     Standing Status:   Standing     Number of Occurrences:   8     Order Specific Question:   Restrict to:     Answer:   Sips with Medications [3]     Physical Exam   Constitutional: He is oriented to person, place, and time. He appears well-developed and well-nourished.   HENT:   Head: Normocephalic.   Eyes: Pupils are equal, round, and reactive to light.   Neck: Normal range of motion.   Cardiovascular: Regular rhythm and normal heart sounds.  Tachycardia present.    Pulmonary/Chest: Effort normal.   Abdominal: Soft. Bowel sounds are normal.   Genitourinary: Rectum normal and penis normal.   Musculoskeletal: Normal range of motion.   Neurological: He is alert and oriented to person, place, and time.   Skin: Skin is warm. He is diaphoretic.   Psychiatric: He has a normal mood and affect.   Nursing note and vitals reviewed.      Recent Labs      01/14/18   1330  01/15/18   0351   WBC  8.4  8.2   RBC  5.82  5.17   HEMOGLOBIN  16.1  14.3   HEMATOCRIT  46.0  41.5*   MCV  79.0*  80.3*   MCH  27.7  27.7   MCHC  35.0  34.5   RDW  35.1*  36.2   PLATELETCT  257  215   MPV  9.7  9.5      Recent Labs      01/14/18   1330  01/15/18   0351   SODIUM  138  136   POTASSIUM  2.9*  3.4*   CHLORIDE  104  106   CO2  25  23   GLUCOSE  83  93   BUN  11  7*   CREATININE  0.86  0.87   CALCIUM  9.7  8.3*     Recent Labs      01/14/18   1330   APTT  33.6   INR  1.08     Recent Labs      01/14/18   1330   BNPBTYPENAT  17              Assessment/Plan     * Tachycardia- (present on admission)   Assessment & Plan    Cardiology consult requested  Unclear episodes of tachycardia associated with dyspnea and weakness          HTN (hypertension)- (present on admission)   Assessment & Plan    Losartan        Elevated troponin- (present on admission)   Assessment & Plan    Serial troponin, echocardiogram done  Would order a stress test  Cardiology eval        Nausea & vomiting- (present on admission)   Assessment & Plan    IV fluid hydration  Unclear episodes  Possible Marijuana withdrawal        Hypophosphatemia- (present on admission)   Assessment & Plan    K-Phos        Hypomagnesemia- (present on admission)   Assessment & Plan    IV and oral magnesium, follow levels        Hypokalemia- (present on admission)   Assessment & Plan    IV KCl and K Dur, follow BMP        History of atrial fibrillation- (present on admission)   Assessment & Plan    Continue Eliquis, the patient is status post ablation ×2            Reviewed items::  Radiology images reviewed, EKG reviewed, Labs reviewed and Medications reviewed  Hyatt catheter::  No Hyatt  DVT prophylaxis pharmacological::  Enoxaparin (Lovenox)        Plan  Cardiology evaluation  Will check cortisol axis parameters  Continue with IV fluids  Would go ahead and order a stress test  Would suggest stopping cannabis products  Discussed with patient and consultant  Medically complex case  Time spent 35+ minutes

## 2018-01-16 NOTE — PROGRESS NOTES
RN notified Dr. Padgett that pt has troponin levels to be drawn every 6 hours. Trop levels have been 0.04 & 0.03 recently. 5th lab specimen drawn at 1600. Per MD, okay to discontinue troponin. Read back by Stacey GODINEZ.

## 2018-01-16 NOTE — PROGRESS NOTES
[facility] Hospitalist Progress Note    Date of Service: 2018    Chief Complaint  32 y.o. male admitted 2018 with prolonged history of cardiac dysrhythmia, has seen Dr. Mora in the past, status post ablation now again, has chronic intermittent complains of weakness and tachycardia. Reports a.m. nausea vomiting almost on a daily basis with unknown source    Interval Problem Update  Patient seen and examined today.    Patient tolerating treatment and therapies.  All Data, Medication data reviewed.  Case discussed with nursing as available.  Plan of Care reviewed with patient and notified of changes.  1/15 the patient was seen briefly by cardiology and emergency room, he has not gotten any answers in terms of his tachycardia and weakness, he does have intermittent left-sided chest pain. I discussed the case with cardiology for reevaluation     no palpitations    No chest pain    No sob    Echo result noted    Stress test pending    Consultants/Specialty  Cardiology    Disposition  To be determined        Review of Systems   Constitutional: Negative.  Negative for chills and fever.   HENT: Negative.    Eyes: Negative.    Respiratory: Negative.  Negative for cough.    Cardiovascular: Positive for chest pain. Negative for palpitations.   Gastrointestinal: Positive for nausea and vomiting. Negative for heartburn.   Genitourinary: Negative.  Negative for dysuria and frequency.   Musculoskeletal: Negative.  Negative for back pain and neck pain.   Skin: Negative.  Negative for itching and rash.   Neurological: Negative.  Negative for dizziness, focal weakness, weakness and headaches.   Endo/Heme/Allergies: Negative.  Negative for polydipsia. Does not bruise/bleed easily.   Psychiatric/Behavioral: Positive for substance abuse. Negative for depression. The patient is nervous/anxious.       Physical Exam  Laboratory/Imaging   Hemodynamics  Temp (24hrs), Av.3 °C (99.2 °F), Min:36.7 °C (98 °F), Max:37.8 °C (100  °F)   Temperature: 36.7 °C (98 °F)  Pulse  Av.9  Min: 76  Max: 128    Blood Pressure: 139/88      Respiratory      Respiration: 16, Pulse Oximetry: 95 %        RUL Breath Sounds: Clear, RML Breath Sounds: Clear, RLL Breath Sounds: Clear, LEOBARDO Breath Sounds: Clear, LLL Breath Sounds: Clear    Fluids    Intake/Output Summary (Last 24 hours) at 18 1158  Last data filed at 18 0400   Gross per 24 hour   Intake             1020 ml   Output                0 ml   Net             1020 ml       Nutrition  Orders Placed This Encounter   Procedures   • DIET NPO     Standing Status:   Standing     Number of Occurrences:   8     Order Specific Question:   Restrict to:     Answer:   Sips with Medications [3]     Physical Exam   Constitutional: He is oriented to person, place, and time. He appears well-developed and well-nourished.   HENT:   Head: Normocephalic.   Eyes: Pupils are equal, round, and reactive to light.   Neck: Normal range of motion.   Cardiovascular: Normal rate and regular rhythm.    Pulmonary/Chest: Effort normal.   Abdominal: Soft. Bowel sounds are normal.   Genitourinary: Rectum normal and penis normal.   Musculoskeletal: Normal range of motion.   Neurological: He is alert and oriented to person, place, and time.   Skin: Skin is warm. He is not diaphoretic.   Psychiatric: He has a normal mood and affect.   Nursing note and vitals reviewed.      Recent Labs      18   1330  01/15/18   0351  18   0350   WBC  8.4  8.2  8.2   RBC  5.82  5.17  5.48   HEMOGLOBIN  16.1  14.3  15.5   HEMATOCRIT  46.0  41.5*  44.5   MCV  79.0*  80.3*  81.2*   MCH  27.7  27.7  28.3   MCHC  35.0  34.5  34.8   RDW  35.1*  36.2  37.0   PLATELETCT  257  215  208   MPV  9.7  9.5  9.6     Recent Labs      18   1330  01/15/18   0351  18   0350   SODIUM  138  136  134*   POTASSIUM  2.9*  3.4*  3.7   CHLORIDE  104  106  103   CO2  25  23  23   GLUCOSE  83  93  87   BUN  11  7*  7*   CREATININE  0.86  0.87   0.79   CALCIUM  9.7  8.3*  8.5     Recent Labs      01/14/18   1330   APTT  33.6   INR  1.08     Recent Labs      01/14/18   1330   BNPBTYPENAT  17              Assessment/Plan     * Tachycardia- (present on admission)   Assessment & Plan    Plan as per cardiology          HTN (hypertension)- (present on admission)   Assessment & Plan    Losartan        Elevated troponin- (present on admission)   Assessment & Plan    Serial troponin, echocardiogram done   stress test today  Cardiology on case        Nausea & vomiting- (present on admission)   Assessment & Plan    resolved        Hypophosphatemia- (present on admission)   Assessment & Plan    K-Phos        Hypomagnesemia- (present on admission)   Assessment & Plan    replaced        Hypokalemia- (present on admission)   Assessment & Plan    replaced        History of atrial fibrillation- (present on admission)   Assessment & Plan    Continue Eliquis, the patient is status post ablation ×2            Reviewed items::  Radiology images reviewed, EKG reviewed, Labs reviewed and Medications reviewed  Hyatt catheter::  No Hyatt  DVT prophylaxis pharmacological::  Enoxaparin (Lovenox)        F/u stress test result

## 2018-01-16 NOTE — CARE PLAN
Problem: Safety  Goal: Will remain free from injury    Intervention: Provide assistance with mobility  Pt steady on gait. Instructed to call staff prn. Ambulating in room.       Problem: Pain Management  Goal: Pain level will decrease to patient's comfort goal  Complaints of jaw/teeth pain. Oxycodone given with good pain relief. Resting right now.

## 2018-01-16 NOTE — DIETARY
Nutrition Services:  Poor PO; Unplanned wt loss    Poor PO, Unplanned wt loss on Nutrition Admit Screen. Pt is currently on a Regular, No caffeine diet and per chart pt PO %. Ht: 167.6 cm, Wt: 77 kg, BMI 27.4.  Attempted to speak with pt at bedside, but pt was sleeping.  Per chart review, pt wt on 11/8 was 78.9 kg (174 lbs), current wt is 77 kg (169 lbs.).  Wt loss  3% in two months is not significant, but worth noting.  Consult RD as needed. RD will re-screen weekly.      RD available PRN.

## 2018-01-17 VITALS
HEART RATE: 74 BPM | WEIGHT: 161.6 LBS | TEMPERATURE: 98.5 F | HEIGHT: 66 IN | SYSTOLIC BLOOD PRESSURE: 149 MMHG | RESPIRATION RATE: 18 BRPM | BODY MASS INDEX: 25.97 KG/M2 | DIASTOLIC BLOOD PRESSURE: 106 MMHG | OXYGEN SATURATION: 94 %

## 2018-01-17 PROBLEM — R79.89 ELEVATED TROPONIN: Status: RESOLVED | Noted: 2018-01-14 | Resolved: 2018-01-17

## 2018-01-17 PROBLEM — E87.6 HYPOKALEMIA: Status: RESOLVED | Noted: 2018-01-14 | Resolved: 2018-01-17

## 2018-01-17 PROBLEM — E83.42 HYPOMAGNESEMIA: Status: RESOLVED | Noted: 2018-01-14 | Resolved: 2018-01-17

## 2018-01-17 PROBLEM — E83.39 HYPOPHOSPHATEMIA: Status: RESOLVED | Noted: 2018-01-14 | Resolved: 2018-01-17

## 2018-01-17 PROBLEM — R11.2 NAUSEA & VOMITING: Status: RESOLVED | Noted: 2018-01-14 | Resolved: 2018-01-17

## 2018-01-17 PROCEDURE — 99239 HOSP IP/OBS DSCHRG MGMT >30: CPT | Performed by: HOSPITALIST

## 2018-01-17 PROCEDURE — 700102 HCHG RX REV CODE 250 W/ 637 OVERRIDE(OP): Performed by: INTERNAL MEDICINE

## 2018-01-17 PROCEDURE — A9270 NON-COVERED ITEM OR SERVICE: HCPCS | Performed by: HOSPITALIST

## 2018-01-17 PROCEDURE — A9270 NON-COVERED ITEM OR SERVICE: HCPCS | Performed by: FAMILY MEDICINE

## 2018-01-17 PROCEDURE — 700102 HCHG RX REV CODE 250 W/ 637 OVERRIDE(OP): Performed by: FAMILY MEDICINE

## 2018-01-17 PROCEDURE — 700101 HCHG RX REV CODE 250: Performed by: FAMILY MEDICINE

## 2018-01-17 PROCEDURE — A9270 NON-COVERED ITEM OR SERVICE: HCPCS | Performed by: INTERNAL MEDICINE

## 2018-01-17 PROCEDURE — 700102 HCHG RX REV CODE 250 W/ 637 OVERRIDE(OP): Performed by: HOSPITALIST

## 2018-01-17 RX ORDER — LOSARTAN POTASSIUM 50 MG/1
50 TABLET ORAL
Status: DISCONTINUED | OUTPATIENT
Start: 2018-01-18 | End: 2018-01-17 | Stop reason: HOSPADM

## 2018-01-17 RX ORDER — LOSARTAN POTASSIUM 50 MG/1
50 TABLET ORAL DAILY
Qty: 30 TAB | Refills: 3 | Status: SHIPPED | OUTPATIENT
Start: 2018-01-18 | End: 2021-08-12

## 2018-01-17 RX ORDER — LOSARTAN POTASSIUM 25 MG/1
25 TABLET ORAL ONCE
Status: COMPLETED | OUTPATIENT
Start: 2018-01-17 | End: 2018-01-17

## 2018-01-17 RX ADMIN — LOSARTAN POTASSIUM 25 MG: 25 TABLET, FILM COATED ORAL at 07:33

## 2018-01-17 RX ADMIN — LABETALOL HYDROCHLORIDE 10 MG: 5 INJECTION, SOLUTION INTRAVENOUS at 04:09

## 2018-01-17 RX ADMIN — POTASSIUM CHLORIDE 20 MEQ: 1500 TABLET, EXTENDED RELEASE ORAL at 07:33

## 2018-01-17 RX ADMIN — MAGNESIUM GLUCONATE 500 MG ORAL TABLET 400 MG: 500 TABLET ORAL at 07:33

## 2018-01-17 RX ADMIN — Medication 1 TABLET: at 07:33

## 2018-01-17 RX ADMIN — POTASSIUM CHLORIDE AND SODIUM CHLORIDE: 900; 150 INJECTION, SOLUTION INTRAVENOUS at 01:25

## 2018-01-17 RX ADMIN — LOSARTAN POTASSIUM 25 MG: 25 TABLET, FILM COATED ORAL at 08:38

## 2018-01-17 RX ADMIN — APIXABAN 5 MG: 5 TABLET, FILM COATED ORAL at 07:33

## 2018-01-17 ASSESSMENT — PAIN SCALES - GENERAL
PAINLEVEL_OUTOF10: 3
PAINLEVEL_OUTOF10: 0
PAINLEVEL_OUTOF10: 2

## 2018-01-17 NOTE — DISCHARGE INSTRUCTIONS
Discharge Instructions    Discharged to home by car with relative. Discharged via wheelchair, hospital escort: Yes.  Special equipment needed: Not Applicable    Be sure to schedule a follow-up appointment with your primary care doctor or any specialists as instructed.     Discharge Plan:   Influenza Vaccine Indication: Not indicated: Previously immunized this influenza season and > 8 years of age    I understand that a diet low in cholesterol, fat, and sodium is recommended for good health. Unless I have been given specific instructions below for another diet, I accept this instruction as my diet prescription.   Other diet: Cardiac    Special Instructions: None    · Is patient discharged on Warfarin / Coumadin?   No     Depression / Suicide Risk    As you are discharged from this [facility] facility, it is important to learn how to keep safe from harming yourself.    Recognize the warning signs:  · Abrupt changes in personality, positive or negative- including increase in energy   · Giving away possessions  · Change in eating patterns- significant weight changes-  positive or negative  · Change in sleeping patterns- unable to sleep or sleeping all the time   · Unwillingness or inability to communicate  · Depression  · Unusual sadness, discouragement and loneliness  · Talk of wanting to die  · Neglect of personal appearance   · Rebelliousness- reckless behavior  · Withdrawal from people/activities they love  · Confusion- inability to concentrate     If you or a loved one observes any of these behaviors or has concerns about self-harm, here's what you can do:  · Talk about it- your feelings and reasons for harming yourself  · Remove any means that you might use to hurt yourself (examples: pills, rope, extension cords, firearm)  · Get professional help from the community (Mental Health, Substance Abuse, psychological counseling)  · Do not be alone:Call your Safe Contact- someone whom you trust who will be there for  you.  · Call your local CRISIS HOTLINE 062-8573 or 970-930-2099  · Call your local Children's Mobile Crisis Response Team [place] (933) 823-3351 or www.Distra  · Call the toll free National Suicide Prevention Hotlines   · National Suicide Prevention Lifeline 981-379-[place] (5689)  · National [employer] Line Network 800-SUICIDE (163-5112)

## 2018-01-17 NOTE — PROGRESS NOTES
Discharge instructions reviewed with pt and family. IV removed. All belongings with pt. All questions answered. Pt walking off unit, refusing wheelchair.

## 2018-01-17 NOTE — PROGRESS NOTES
Pt refusing BA at this time, educated on risks and verbalizes understanding. Continuing to refuse.

## 2018-01-17 NOTE — PROGRESS NOTES
Notified of pt high bp and increased pain. Reviewed and administered prn blood pressure and pain med. Pt provided heat packs. Will continue to monitor.

## 2018-01-17 NOTE — PROGRESS NOTES
Received bedside report from day shift nurse, Chantale. Pt is currently resting in bed, on room air. Pt is alert and oriented. Pt has no complaints of pain or any distress noted at this time. Bed in lowest position. Call light within reach. Will continue to monitor.

## 2018-01-17 NOTE — CARE PLAN
Problem: Bowel/Gastric:  Goal: Normal bowel function is maintained or improved  Outcome: PROGRESSING SLOWER THAN EXPECTED  + BM 1/16 per pt, bowel sounds normoactive    Problem: Pain Management  Goal: Pain level will decrease to patient's comfort goal  Outcome: PROGRESSING AS EXPECTED  Pain well controlled at this time with PRN medication

## 2018-01-17 NOTE — CARE PLAN
Problem: Communication  Goal: The ability to communicate needs accurately and effectively will improve  Outcome: PROGRESSING AS EXPECTED  Pt encouraged and is able to communicate concerns and/or needs effectively.    Problem: Safety  Goal: Will remain free from falls  Outcome: PROGRESSING AS EXPECTED  Pt educated to call for assistance when needed. Pt calls appropriately.

## 2018-01-17 NOTE — PROGRESS NOTES
Pt A&O x 4, c/o slight tooth pain but declining medication at this time, denies numbness and tingling. Up self to bathroom, voiding well and last BM 1/16 per pt. Plan of care discussed and all needs addressed at this time.

## 2018-01-17 NOTE — PROGRESS NOTES
Notified of pt high bp. Will review prn bp med, otherwise pt has no complaints of pain or any distress noted at this time. Will continue to monitor.

## 2018-01-18 ENCOUNTER — TELEPHONE (OUTPATIENT)
Dept: CARDIOLOGY | Facility: MEDICAL CENTER | Age: 33
End: 2018-01-18

## 2018-01-18 NOTE — DISCHARGE SUMMARY
CHIEF COMPLAINT ON ADMISSION  Chief Complaint   Patient presents with   • Palpitations       CODE STATUS  Prior    HPI & HOSPITAL COURSE  HPI:  Elian Sanchez is a 32 y.o.-year-old male with a history of hypertension and atrial fibrillation s/p radiofrequency ablation x 2 (most recently 1/10/2018 by Dr. Groves), who presents with tachycardia.      He reports having an episode of syncope for which he presented to the ER 2 days ago.  Work-up including echocardiogram was normal.  Since then, he has general not felt well and had multiple episodes when his entire body feels numb associated with sweating and palpitations.  He has a chronic dry cough.  He has bene fatigued.  Today he reports he hasn't eaten or had anything to drink.    He was hydrated. He had and echo and stress test - see results below. He was cleared by cardiology and discharged on 1/17    Therefore, he is discharged in good and stable condition with close outpatient follow-up.    SPECIFIC OUTPATIENT FOLLOW-UP  pcp 1 week    DISCHARGE PROBLEM LIST  Principal Problem (Resolved):    Tachycardia POA: Yes  Active Problems:    History of atrial fibrillation POA: Yes    HTN (hypertension) POA: Yes  Resolved Problems:    Hypokalemia POA: Yes    Hypomagnesemia POA: Yes    Hypophosphatemia POA: Yes    Nausea & vomiting POA: Yes    Elevated troponin POA: Yes      FOLLOW UP  Future Appointments  Date Time Provider Department Center   1/24/2018 12:40 PM Joann Giron, A.P.N. [facility] None     Primary Care Provider    Schedule an appointment as soon as possible for a visit in 1 week  Hospital follow-up appointment with PCP    Pcp Pt States None            MEDICATIONS ON DISCHARGE   Elian Sanchez   Home Medication Instructions ALMAS:[id number]    Printed on:01/18/18 1028   Medication Information                      apixaban (ELIQUIS) 5mg Tab  Take 1 Tab by mouth 2 Times a Day.             losartan (COZAAR) 50 MG Tab  Take 1 Tab by mouth every day.                  DIET  No orders of the defined types were placed in this encounter.      ACTIVITY  As tolerated.  Weight bearing as tolerated      CONSULTATIONS  Dr parsons cardiology    PROCEDURES  Linked Documents     View SynapseCV Report   Last Resulted Time   Sun 2018  5:29 PM   Images     Show images for ECHOCARDIOGRAM COMP W/O CONT   Imaging Result Status     Status: Final result (Collected: 2018  4:00 PM)   Imaging Previous Results     Open Hard Copy Result Report (Order #[id number] - ECHOCARDIOGRAM COMP W/O CONT)   Narrative     Transthoracic  Echo Report      Echocardiography Laboratory    CONCLUSIONS  1. Normal biventricular function  2. Normal pericardium without effusion.  3. Limited study.     Compared to the report of the study done on 2018 there has been:   no significant change.    OLESYA TAYLOR  Exam Date:         2018                      16:00  Exam Location:     Inpatient  Priority:          Routine    Ordering Physician:        CORNEL PARSONS                               ([id number])  Referring Physician:       [id number] Burt  Sonographer:               Gina Romero RDCS    Age:    32     Gender:    M  MRN:    [id number]  :    1985  BSA:    1.87   Ht (in):    66     Wt (lb):    170  Exam Type:     Limited    Indications:     Pericardial Effusion  ICD Codes:       423.9    CPT Codes:       27800    BP:   151    /   102    HR:   108  Technical Quality:       Fair    MEASUREMENTS  (Male / Female) Normal Values  2D ECHO  Estimated LV Ejection Fraction    65 %                      * Indicates values subject to auto-interpretation  LV EF:  65    %    FINDINGS  Left Ventricle  Normal left ventricular systolic function. Left ventricular ejection   fraction is visually estimated to be 65%. Normal regional wall motion.    Right Ventricle  Normal right ventricular size and systolic function.    Right Atrium      Left Atrium      Mitral Valve      Aortic Valve      Tricuspid  Valve      Pulmonic Valve      Pericardium  Normal pericardium without effusion.    Aorta                        Neal Herbert MD     -----------------------------------------------------------------------------------------------       Myocardial Perfusion   Report   NUCLEAR IMAGING INTERPRETATION   No evidence of significant jeopardized viable myocardium or prior myocardial    infarction.   Normal left ventricular size, ejection fraction, and wall motion.   ECG INTERPRETATION   Negative stress ECG for ischemia.     OLESYA TAYLOR     MRN:    [id number]         Gender:    M     Exam Date: 2018 06:20     Exam Location:      Inpatient     Ordering Phys:     JULIETH ISABEL     NucMed Tech:       Caroline Silverman RT                       (N)     Age:    32    :    1985        Ht (in):     66     Wt (lb):     167    BMI:    26.79       Radiologist     Risk Factors:             Hypertension     Indications:              Chest pain, unspecified     ICD Codes:                R079     Cardiac History:          Positive risk factors     Cardiac Meds:     Meds Past 24 hrs:     Pretest Chest Pain:     STRESS TEST      Pharmacologi                    c   Protoc   Lexiscan w/    Dose: 0.4 mg   ol:      Exer                         Post-Injection Exercise:        An additional 1 minutes of exercise followed   the                                    intravenous injection               Resting HR (bpm):      92     Peak HR (bpm):         155     Resting BP (mmHg):       131    /   85     Peak BP (mmHg):       171   /   86     MaxPHR:     190     Target HR (bpm):       162     % MaxPHR:     82     Double Product:       32297     BP Response:     Stress Termination:       Completed Protocol     Stress Symptoms:   Arrhythmia:  Occ PVCs. Fatigue Flushing Nausea Dyspnea     ECG     Resting ECG:     Sinus rhythm.     Stress ECG:      No ischemic changes with Regadenoson.     IMAGE PROTOCOL       Rest/Stress 1                        Day             RadiopharmaceuticalDose (mCi)   Imaging  Date      Imaging  Time           Inj to Img Time (min)   Rest:   Tc-99m             6.99         16-Jan-2018        11:45                   60           Tetrofosmin   Stress: Tc-99m             23.3         16-Jan-2018        14:08                   90           Tetrofosmin     Rest:   Administration Site:       Left antecubital                               fossa   Administered by:      Caroline Silverman RT (N)     Stress:   Administration Site:       Left antecubital                               fossa   Administered by:      Pedro Pablo Bustamante RN     % Percent HR Achieved:   SPECT RESULTS     Technical Quality:       Excellent     Raw Data Analysis:   Summed Stress Score:    0   Summed Rest Score:    0   Summed Difference Score:        1   PERFUSION:   Normal myocardial perfusion with no ischemia.     FUNCTIONAL RESULTS (calculated via Gated SPECT)     Stress Image LV EF:        59     %     Upper Normal Limit     Stress EDV:      94     ml   EDVI:    51      ml/m²     Stress ESV:      39     ml   ESVI:    21      ml/m²     TID:    0.98   TID - 1.19      TID (ed) - 1.23   LV Function:   Normal left ventricular wall motion.  LV ejection fraction = 59%.      LABORATORY  Lab Results   Component Value Date/Time    SODIUM 134 (L) 01/16/2018 03:50 AM    POTASSIUM 3.7 01/16/2018 03:50 AM    CHLORIDE 103 01/16/2018 03:50 AM    CO2 23 01/16/2018 03:50 AM    GLUCOSE 87 01/16/2018 03:50 AM    BUN 7 (L) 01/16/2018 03:50 AM    CREATININE 0.79 01/16/2018 03:50 AM    CREATININE 0.8 09/22/2005 09:30 PM        Lab Results   Component Value Date/Time    WBC 8.2 01/16/2018 03:50 AM    HEMOGLOBIN 15.5 01/16/2018 03:50 AM    HEMATOCRIT 44.5 01/16/2018 03:50 AM    PLATELETCT 208 01/16/2018 03:50 AM        Total time of the discharge process exceeds 38 minutes

## 2018-01-19 NOTE — TELEPHONE ENCOUNTER
PAR sent to patient's plan and in process:      OLESYA TAYLOR Key: [id number]   Status   Sent to [facility]   DrugLosartan Potassium 50MG tablets   [facility] Electronic Prior Authorization Form (Envolve) (CB)

## 2018-01-20 LAB
BACTERIA BLD CULT: NORMAL
BACTERIA BLD CULT: NORMAL
SIGNIFICANT IND 70042: NORMAL
SIGNIFICANT IND 70042: NORMAL
SITE SITE: NORMAL
SITE SITE: NORMAL
SOURCE SOURCE: NORMAL
SOURCE SOURCE: NORMAL

## 2018-03-14 ENCOUNTER — TELEPHONE (OUTPATIENT)
Dept: CARDIOLOGY | Facility: MEDICAL CENTER | Age: 33
End: 2018-03-14

## 2018-03-14 DIAGNOSIS — R55 SYNCOPE AND COLLAPSE: ICD-10-CM

## 2018-03-14 NOTE — TELEPHONE ENCOUNTER
V/O JOSEPH Rodríguez:  Zio patch for dx syncope.   Ordered in EPIC.   L/M on pt's phone advising him he will be called to schedule monitor.   Pt missed appt today.

## 2020-09-14 NOTE — ED NOTES
-Admission notable for progressive NANETTE since approximately 09/08 with worsening Cr associated with increased anion gap metabolic acidosis.   -Status post evaluation by nephrology with concern for ATN based on urine microscopy showing scant normal red blood cells without any dysmorphic shapes evident and numerous muddy brown casts.   -Admission to ICU notable for continued decline in renal function with eventual initiation of CRRT on 09/13.   -Etiology of continued decline in renal function is likely multifactorial from shock vs progression of CHF vs DKA vs medication side-effect.     Plan:   -Nephrology following for CRRT management.   -Trending renal function Q4H while treating DKA.   -Trialysis line place on 09/13 for HD access.   -Strict I/O's and daily weights, if possible.   -Renally dose all medications.    Pt to tele, report given.

## 2020-12-17 ENCOUNTER — PRE-ADMISSION TESTING (OUTPATIENT)
Dept: ADMISSIONS | Facility: MEDICAL CENTER | Age: 35
End: 2020-12-17
Attending: ORTHOPAEDIC SURGERY
Payer: COMMERCIAL

## 2020-12-17 DIAGNOSIS — Z01.812 PRE-OPERATIVE LABORATORY EXAMINATION: ICD-10-CM

## 2020-12-17 LAB
COVID ORDER STATUS COVID19: NORMAL
SARS-COV-2 RNA RESP QL NAA+PROBE: NOTDETECTED
SPECIMEN SOURCE: NORMAL

## 2020-12-17 PROCEDURE — C9803 HOPD COVID-19 SPEC COLLECT: HCPCS

## 2020-12-17 PROCEDURE — U0003 INFECTIOUS AGENT DETECTION BY NUCLEIC ACID (DNA OR RNA); SEVERE ACUTE RESPIRATORY SYNDROME CORONAVIRUS 2 (SARS-COV-2) (CORONAVIRUS DISEASE [COVID-19]), AMPLIFIED PROBE TECHNIQUE, MAKING USE OF HIGH THROUGHPUT TECHNOLOGIES AS DESCRIBED BY CMS-2020-01-R: HCPCS

## 2020-12-17 RX ORDER — IBUPROFEN 200 MG
200 TABLET ORAL EVERY 6 HOURS PRN
COMMUNITY
End: 2023-06-15

## 2020-12-21 ENCOUNTER — ANESTHESIA EVENT (OUTPATIENT)
Dept: SURGERY | Facility: MEDICAL CENTER | Age: 35
End: 2020-12-21
Payer: COMMERCIAL

## 2020-12-21 NOTE — OR NURSING
COVID-19 Pre-surgery screenin. Do you have an undiagnosed respiratory illness or symptoms such as coughing or sneezing? no (Yes/No)  a. Onset of Sx no  b. Acute vs. chronic respiratory illness no    2. Do you have an unexplained fever greater than 100.4 degrees Fahrenheit or 38 degrees Celsius?     no (Yes/No)    3. Have you had direct exposure to a patient who tested positive for Covid-19?    no (Yes/No)    4. Have you had any loss of your sense of taste or smell? Have you had N/V or sore throat? no    Patient has been informed of visitor policy and asked to wear a mask upon entering the hospital   no (Yes/No)

## 2020-12-22 ENCOUNTER — APPOINTMENT (OUTPATIENT)
Dept: RADIOLOGY | Facility: MEDICAL CENTER | Age: 35
End: 2020-12-22
Attending: ORTHOPAEDIC SURGERY
Payer: COMMERCIAL

## 2020-12-22 ENCOUNTER — ANESTHESIA (OUTPATIENT)
Dept: SURGERY | Facility: MEDICAL CENTER | Age: 35
End: 2020-12-22
Payer: COMMERCIAL

## 2020-12-22 ENCOUNTER — HOSPITAL ENCOUNTER (OUTPATIENT)
Facility: MEDICAL CENTER | Age: 35
End: 2020-12-22
Attending: ORTHOPAEDIC SURGERY | Admitting: ORTHOPAEDIC SURGERY
Payer: COMMERCIAL

## 2020-12-22 VITALS
BODY MASS INDEX: 24.84 KG/M2 | WEIGHT: 145.5 LBS | HEART RATE: 79 BPM | DIASTOLIC BLOOD PRESSURE: 85 MMHG | RESPIRATION RATE: 18 BRPM | OXYGEN SATURATION: 99 % | HEIGHT: 64 IN | TEMPERATURE: 98 F | SYSTOLIC BLOOD PRESSURE: 134 MMHG

## 2020-12-22 PROCEDURE — 700111 HCHG RX REV CODE 636 W/ 250 OVERRIDE (IP): Performed by: ANESTHESIOLOGY

## 2020-12-22 PROCEDURE — 160035 HCHG PACU - 1ST 60 MINS PHASE I: Performed by: ORTHOPAEDIC SURGERY

## 2020-12-22 PROCEDURE — 73100 X-RAY EXAM OF WRIST: CPT | Mod: RT

## 2020-12-22 PROCEDURE — 501838 HCHG SUTURE GENERAL: Performed by: ORTHOPAEDIC SURGERY

## 2020-12-22 PROCEDURE — 700105 HCHG RX REV CODE 258: Performed by: ORTHOPAEDIC SURGERY

## 2020-12-22 PROCEDURE — A9270 NON-COVERED ITEM OR SERVICE: HCPCS | Performed by: ORTHOPAEDIC SURGERY

## 2020-12-22 PROCEDURE — 700101 HCHG RX REV CODE 250: Performed by: ORTHOPAEDIC SURGERY

## 2020-12-22 PROCEDURE — 500881 HCHG PACK, EXTREMITY: Performed by: ORTHOPAEDIC SURGERY

## 2020-12-22 PROCEDURE — 160046 HCHG PACU - 1ST 60 MINS PHASE II: Performed by: ORTHOPAEDIC SURGERY

## 2020-12-22 PROCEDURE — 160039 HCHG SURGERY MINUTES - EA ADDL 1 MIN LEVEL 3: Performed by: ORTHOPAEDIC SURGERY

## 2020-12-22 PROCEDURE — 700102 HCHG RX REV CODE 250 W/ 637 OVERRIDE(OP): Performed by: ANESTHESIOLOGY

## 2020-12-22 PROCEDURE — 160048 HCHG OR STATISTICAL LEVEL 1-5: Performed by: ORTHOPAEDIC SURGERY

## 2020-12-22 PROCEDURE — 160028 HCHG SURGERY MINUTES - 1ST 30 MINS LEVEL 3: Performed by: ORTHOPAEDIC SURGERY

## 2020-12-22 PROCEDURE — 160002 HCHG RECOVERY MINUTES (STAT): Performed by: ORTHOPAEDIC SURGERY

## 2020-12-22 PROCEDURE — 160025 RECOVERY II MINUTES (STATS): Performed by: ORTHOPAEDIC SURGERY

## 2020-12-22 PROCEDURE — A9270 NON-COVERED ITEM OR SERVICE: HCPCS | Performed by: ANESTHESIOLOGY

## 2020-12-22 PROCEDURE — 160009 HCHG ANES TIME/MIN: Performed by: ORTHOPAEDIC SURGERY

## 2020-12-22 PROCEDURE — 700101 HCHG RX REV CODE 250: Performed by: ANESTHESIOLOGY

## 2020-12-22 RX ORDER — MIDAZOLAM HYDROCHLORIDE 1 MG/ML
INJECTION INTRAMUSCULAR; INTRAVENOUS PRN
Status: DISCONTINUED | OUTPATIENT
Start: 2020-12-22 | End: 2020-12-22 | Stop reason: SURG

## 2020-12-22 RX ORDER — DEXAMETHASONE SODIUM PHOSPHATE 4 MG/ML
INJECTION, SOLUTION INTRA-ARTICULAR; INTRALESIONAL; INTRAMUSCULAR; INTRAVENOUS; SOFT TISSUE PRN
Status: DISCONTINUED | OUTPATIENT
Start: 2020-12-22 | End: 2020-12-22 | Stop reason: SURG

## 2020-12-22 RX ORDER — SODIUM CHLORIDE, SODIUM LACTATE, POTASSIUM CHLORIDE, CALCIUM CHLORIDE 600; 310; 30; 20 MG/100ML; MG/100ML; MG/100ML; MG/100ML
INJECTION, SOLUTION INTRAVENOUS CONTINUOUS
Status: DISCONTINUED | OUTPATIENT
Start: 2020-12-22 | End: 2020-12-22 | Stop reason: HOSPADM

## 2020-12-22 RX ORDER — MEPERIDINE HYDROCHLORIDE 25 MG/ML
12.5 INJECTION INTRAMUSCULAR; INTRAVENOUS; SUBCUTANEOUS
Status: DISCONTINUED | OUTPATIENT
Start: 2020-12-22 | End: 2020-12-22 | Stop reason: HOSPADM

## 2020-12-22 RX ORDER — CEFAZOLIN SODIUM 1 G/3ML
INJECTION, POWDER, FOR SOLUTION INTRAMUSCULAR; INTRAVENOUS PRN
Status: DISCONTINUED | OUTPATIENT
Start: 2020-12-22 | End: 2020-12-22 | Stop reason: SURG

## 2020-12-22 RX ORDER — OXYCODONE HYDROCHLORIDE AND ACETAMINOPHEN 5; 325 MG/1; MG/1
1 TABLET ORAL
Status: COMPLETED | OUTPATIENT
Start: 2020-12-22 | End: 2020-12-22

## 2020-12-22 RX ORDER — CELECOXIB 200 MG/1
400 CAPSULE ORAL ONCE
Status: COMPLETED | OUTPATIENT
Start: 2020-12-22 | End: 2020-12-22

## 2020-12-22 RX ORDER — ACETAMINOPHEN 500 MG
500-1000 TABLET ORAL EVERY 6 HOURS PRN
COMMUNITY
End: 2023-06-15

## 2020-12-22 RX ORDER — HYDROMORPHONE HYDROCHLORIDE 1 MG/ML
0.2 INJECTION, SOLUTION INTRAMUSCULAR; INTRAVENOUS; SUBCUTANEOUS
Status: DISCONTINUED | OUTPATIENT
Start: 2020-12-22 | End: 2020-12-22 | Stop reason: HOSPADM

## 2020-12-22 RX ORDER — DIPHENHYDRAMINE HYDROCHLORIDE 50 MG/ML
12.5 INJECTION INTRAMUSCULAR; INTRAVENOUS
Status: DISCONTINUED | OUTPATIENT
Start: 2020-12-22 | End: 2020-12-22 | Stop reason: HOSPADM

## 2020-12-22 RX ORDER — LABETALOL HYDROCHLORIDE 5 MG/ML
5 INJECTION, SOLUTION INTRAVENOUS
Status: DISCONTINUED | OUTPATIENT
Start: 2020-12-22 | End: 2020-12-22 | Stop reason: HOSPADM

## 2020-12-22 RX ORDER — BUPIVACAINE HYDROCHLORIDE AND EPINEPHRINE 5; 5 MG/ML; UG/ML
INJECTION, SOLUTION PERINEURAL
Status: DISCONTINUED | OUTPATIENT
Start: 2020-12-22 | End: 2020-12-22 | Stop reason: HOSPADM

## 2020-12-22 RX ORDER — HYDROMORPHONE HYDROCHLORIDE 1 MG/ML
0.1 INJECTION, SOLUTION INTRAMUSCULAR; INTRAVENOUS; SUBCUTANEOUS
Status: DISCONTINUED | OUTPATIENT
Start: 2020-12-22 | End: 2020-12-22 | Stop reason: HOSPADM

## 2020-12-22 RX ORDER — LIDOCAINE HYDROCHLORIDE 20 MG/ML
INJECTION, SOLUTION EPIDURAL; INFILTRATION; INTRACAUDAL; PERINEURAL PRN
Status: DISCONTINUED | OUTPATIENT
Start: 2020-12-22 | End: 2020-12-22 | Stop reason: SURG

## 2020-12-22 RX ORDER — ONDANSETRON 2 MG/ML
4 INJECTION INTRAMUSCULAR; INTRAVENOUS
Status: DISCONTINUED | OUTPATIENT
Start: 2020-12-22 | End: 2020-12-22 | Stop reason: HOSPADM

## 2020-12-22 RX ORDER — HALOPERIDOL 5 MG/ML
1 INJECTION INTRAMUSCULAR
Status: DISCONTINUED | OUTPATIENT
Start: 2020-12-22 | End: 2020-12-22 | Stop reason: HOSPADM

## 2020-12-22 RX ORDER — OXYCODONE HYDROCHLORIDE AND ACETAMINOPHEN 5; 325 MG/1; MG/1
2 TABLET ORAL
Status: COMPLETED | OUTPATIENT
Start: 2020-12-22 | End: 2020-12-22

## 2020-12-22 RX ORDER — HYDROMORPHONE HYDROCHLORIDE 1 MG/ML
0.4 INJECTION, SOLUTION INTRAMUSCULAR; INTRAVENOUS; SUBCUTANEOUS
Status: DISCONTINUED | OUTPATIENT
Start: 2020-12-22 | End: 2020-12-22 | Stop reason: HOSPADM

## 2020-12-22 RX ADMIN — FENTANYL CITRATE 50 MCG: 50 INJECTION, SOLUTION INTRAMUSCULAR; INTRAVENOUS at 13:15

## 2020-12-22 RX ADMIN — PROPOFOL 200 MG: 10 INJECTION, EMULSION INTRAVENOUS at 13:00

## 2020-12-22 RX ADMIN — LIDOCAINE HYDROCHLORIDE 60 MG: 20 INJECTION, SOLUTION EPIDURAL; INFILTRATION; INTRACAUDAL at 13:00

## 2020-12-22 RX ADMIN — POVIDONE IODINE 15 ML: 100 SOLUTION TOPICAL at 12:33

## 2020-12-22 RX ADMIN — FENTANYL CITRATE 50 MCG: 50 INJECTION, SOLUTION INTRAMUSCULAR; INTRAVENOUS at 13:09

## 2020-12-22 RX ADMIN — CELECOXIB 400 MG: 200 CAPSULE ORAL at 12:32

## 2020-12-22 RX ADMIN — FENTANYL CITRATE 50 MCG: 50 INJECTION, SOLUTION INTRAMUSCULAR; INTRAVENOUS at 13:23

## 2020-12-22 RX ADMIN — SODIUM CHLORIDE, POTASSIUM CHLORIDE, SODIUM LACTATE AND CALCIUM CHLORIDE: 600; 310; 30; 20 INJECTION, SOLUTION INTRAVENOUS at 13:50

## 2020-12-22 RX ADMIN — FENTANYL CITRATE 50 MCG: 50 INJECTION, SOLUTION INTRAMUSCULAR; INTRAVENOUS at 13:34

## 2020-12-22 RX ADMIN — CEFAZOLIN 2 G: 330 INJECTION, POWDER, FOR SOLUTION INTRAMUSCULAR; INTRAVENOUS at 13:00

## 2020-12-22 RX ADMIN — MIDAZOLAM HYDROCHLORIDE 2 MG: 1 INJECTION, SOLUTION INTRAMUSCULAR; INTRAVENOUS at 12:56

## 2020-12-22 RX ADMIN — FENTANYL CITRATE 50 MCG: 50 INJECTION, SOLUTION INTRAMUSCULAR; INTRAVENOUS at 12:58

## 2020-12-22 RX ADMIN — DEXAMETHASONE SODIUM PHOSPHATE 8 MG: 4 INJECTION, SOLUTION INTRA-ARTICULAR; INTRALESIONAL; INTRAMUSCULAR; INTRAVENOUS; SOFT TISSUE at 13:04

## 2020-12-22 RX ADMIN — SODIUM CHLORIDE, POTASSIUM CHLORIDE, SODIUM LACTATE AND CALCIUM CHLORIDE: 600; 310; 30; 20 INJECTION, SOLUTION INTRAVENOUS at 12:33

## 2020-12-22 RX ADMIN — FENTANYL CITRATE 25 MCG: 50 INJECTION, SOLUTION INTRAMUSCULAR; INTRAVENOUS at 14:28

## 2020-12-22 RX ADMIN — FENTANYL CITRATE 50 MCG: 50 INJECTION, SOLUTION INTRAMUSCULAR; INTRAVENOUS at 13:38

## 2020-12-22 RX ADMIN — OXYCODONE HYDROCHLORIDE AND ACETAMINOPHEN 2 TABLET: 5; 325 TABLET ORAL at 14:34

## 2020-12-22 ASSESSMENT — PAIN DESCRIPTION - PAIN TYPE
TYPE: SURGICAL PAIN

## 2020-12-22 ASSESSMENT — PAIN SCALES - GENERAL: PAIN_LEVEL: 0

## 2020-12-22 NOTE — DISCHARGE INSTR - OTHER INFO
DR. ALMARAZ'S POST-OPERATIVE INSTRUCTIONS    You have just undergone a sugery by Dr. Almaraz in the operating room.  It is our wish that your postoperative recovery be as quick and comfortable as possible.  Please carefully review the following items that are important for your recovery.    After any operation, a certain degree of pain is to be expected. Take Advil (ibuprofen) and Extra Strength Tylenol as first line medications for mild to moderate pain. Taking each one every 6 hours, and staggering them so that you are taking one medicine every 3 hours, is the most effective. Refer to dosing instructions on the bottle, but in general ibuprofen dose is 600-800mg and Tylenol dose is 500-1000mg. For most small procedures, this should be enough to keep you comfortable. Take these medications until your followup visit. You may have been given a small prescription for stronger pain medicine which will help relieve more severe pain.  Pain medicine may make you drowsy so please keep this in mind.  Do not drive while taking pain medicine.      When you go home, please keep your operated arm elevated at all times (above the level of your heart).  If you do this, your swelling will resolve more quickly and your pain will be improve more quickly as well. You may also place an ice pack over your dressing or splint to help with swelling and pain.    It is also very important to keep your fingers moving after most procedures, unless you had a tendon repair or fracture repair in which case you will be in a splint. Keep all of your fingers moving through a full range of motion to prevent stiffness.    For small hand procedures such as carpal tunnel release, trigger finger release, and cyst excision, the dressing that you have on your extremity should remain on for 3-4 days. It may then be removed, you can wash the incision  gently with soap and water, and keep the incision covered with a band-aid or similar clean dressing. For larger procedures or if you have a splint on, these should remain on until follow up or as specifically instructed. If you feel that your dressing is too tight during the first 3 days after surgery, you may loosen it. It is normal to see minor staining on the dressing after surgery. If there is significant bleeding, you are advised to call the office during regular office hours to have this checked.  Make sure that your dressing is kept dry at all times.  You can take a shower if you cover your arm with a plastic bag. If your dressing gets wet, replace it with sterile dressing that you can obtain from your local drug store.    Please call our office for a follow-up appointment, [phone]. Follow up after surgery is typically 10-14 days, unless you were specifically instructed otherwise. The sutures will be removed and you may be asked to see a hand therapist to optimize your functional result. Each of the hand therapists that you will be referred to have received special training in the care of the hand and upper extremity.    If you have questions regarding your surgery postop that you feel requires attention, please call the office at [phone] during business hours, or [phone] after hours for the answering service. If you feel that you have a surgical emergency postoperatively that requires immediate attention, please call the above numbers or go to the Emergency Department and ask for the Orthopedic Surgeon on call.

## 2020-12-22 NOTE — ANESTHESIA TIME REPORT
Anesthesia Start and Stop Event Times     Date Time Event    12/22/2020 1229 Ready for Procedure     1254 Anesthesia Start     1403 Anesthesia Stop        Responsible Staff  12/22/20    Name Role Begin End    Cassandra Del Cid M.D. 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

## 2020-12-22 NOTE — ANESTHESIA POSTPROCEDURE EVALUATION
Patient: Elian Sanchez    Procedure Summary     Date: 12/22/20 Room / Location: [facility] ROOM 21 / SURGERY SAME DAY [facility]    Anesthesia Start: 1254 Anesthesia Stop: 1403    Procedure: ORIF, WRIST - FOR PROXIMAL ROW CARPECTOMY (Right Wrist) Diagnosis: (PAIN IN RIGHT WRIST, KLENBOCK'S DISEASE OF ADULTS)    Surgeons: Moise Almaarz M.D. Responsible Provider: Cassandra Del Cid M.D.    Anesthesia Type: general ASA Status: 2          Final Anesthesia Type: general  Last vitals  BP   Blood Pressure: (!) 94/54    Temp   36.7 °C (98 °F)    Pulse   Pulse: (!) 53   Resp   14    SpO2   100 %      Anesthesia Post Evaluation    Patient location during evaluation: PACU  Patient participation: complete - patient participated  Level of consciousness: awake and alert  Pain score: 0    Airway patency: patent  Anesthetic complications: no  Cardiovascular status: hemodynamically stable  Respiratory status: acceptable  Hydration status: euvolemic    PONV: none           Nurse Pain Score: 0 (NPRS)

## 2020-12-22 NOTE — ANESTHESIA PREPROCEDURE EVALUATION
Relevant Problems   NEURO   (+) History of atrial fibrillation      CARDIAC   (+) Atrial fibrillation with tachycardic ventricular rate (HCC)   (+) HTN (hypertension)      Other   (+) History of cardiac radiofrequency ablation       Physical Exam    Airway   Mallampati: II  TM distance: >3 FB  Neck ROM: full       Cardiovascular - normal exam  Rhythm: regular  Rate: normal  (-) murmur     Dental - normal exam           Pulmonary - normal exam  Breath sounds clear to auscultation     Abdominal    Neurological - normal exam                 Anesthesia Plan    ASA 2       Plan - general       Airway plan will be LMA        Induction: intravenous    Postoperative Plan: Postoperative administration of opioids is intended.    Pertinent diagnostic labs and testing reviewed    Informed Consent:    Anesthetic plan and risks discussed with patient.    Use of blood products discussed with: patient whom consented to blood products.

## 2020-12-22 NOTE — ANESTHESIA QCDR
2019 [facility] Clinical Data Registry (for Quality Improvement)     Postoperative nausea/vomiting risk protocol (Adult = 18 yrs and Pediatric 3-17 yrs)- (430 and 463)  General inhalation anesthetic (NOT TIVA) with PONV risk factors: No  Provision of anti-emetic therapy with at least 2 different classes of agents: N/A  Patient DID NOT receive anti-emetic therapy and reason is documented in Medical Record: N/A    Multimodal Pain Management- (477)  Non-emergent surgery AND patient age >= 18: Yes  Use of Multimodal Pain Management, two or more drugs and/or interventions, NOT including systemic opioids: Yes  Exception: Documented allergy to multiple classes of analgesics: N/A    Smoking Abstinence (404)  Patient is current smoker (cigarette, pipe, e-cig, marijuanna): No  Elective Surgery:   Abstinence instructions provided prior to day of surgery:   Patient abstained from smoking on day of surgery:     Pre-Op Beta-Blocker in Isolated CABG (44)  Isolated CABG AND patient age >= 18: No  Beta-blocker admin within 24 hours of surgical incision:   Exception:of medical reason(s) for not administering beta blocker within 24 hours prior to surgical incision (e.g., not  indicated,other medical reason):     PACU assessment of acute postoperative pain prior to Anesthesia Care End- Applies to Patients Age = 18- (ABG7)  Initial PACU pain score is which of the following: < 7/10  Patient unable to report pain score: N/A    Post-anesthetic transfer of care checklist/protocol to PACU/ICU- (426 and 427)  Upon conclusion of case, patient transferred to which of the following locations: PACU/Non-ICU  Use of transfer checklist/protocol: Yes  Exclusion: Service Performed in Patient Hospital Room (and thus did not require transfer): N/A  Unplanned admission to ICU related to anesthesia service up through end of PACU care- (MD51)  Unplanned admission to ICU (not initially anticipated at anesthesia start time): No

## 2020-12-22 NOTE — OP REPORT
OPERATIVE NOTE     DATE OF PROCEDURE: 12/22/2020            PRE-OP DIAGNOSIS: Right wrist kienbocks disease            POST-OP DIAGNOSIS: same            PROCEDURE: Right wrist proximal row carpectomy, PIN neurectomy, radial styloidectomy, EPL transposition            SURGEON: Moise Almaraz M.D. - Primary            ANESTHESIA: General            ESTIMATED BLOOD LOSS: Minimal                   SPECIMENS: None            COMPLICATIONS: None            CONDITION: Stable to PACU            OPERATIVE INDICATIONS AND DESCRIPTION OF PROCEDURE: This is a pleasant 35-year-old gentleman who presented to my office with right wrist pain.  Radiographs revealed a chronic appearing Kienbocks disease with significant collapse and fragmentation of the lunate and resultant arthritic changes.  They failed conservative treatment for an extended period of time consisting of splinting and cortisone injections.  We discussed surgical options.  We discussed proximal row carpectomy with adjuvant PIN neurectomy and EPL transposition, as well as a radial styloidectomy as needed.  They elected to proceed with PRC.  We discussed risks, benefits, alternatives.  We discussed risks including, but not limited to, bleeding, infection, wrist instability, ongoing pain, degenerative changes in the future which could necessitate further surgery including conversion to wrist fusion, stiffness in the wrist, weakness, stiffness in the fingers, risks of anesthesia.  No guarantees regarding outcome were implied or expressed.  The elected to proceed.  I saw the patient in the preoperative holding area, identified them, and marked the wrist.  They were taken back to the operating room.  General anesthesia was induced by the anesthesiologist.  A tourniquet was placed on the arm and the upper extremity was prepped and draped in usual orthopedic fashion.  A timeout was performed.  The tourniquet was inflated to 250 mmHg.  I made an 8 cm longitudinal incision  on the dorsal aspect of the wrist centered over Arsenio tubercle.  I bluntly dissected the subcutaneous tissues and maintain hemostasis using bipolar cautery.  I identified the extensor retinaculum and opened it over the third compartment.  I completely released the EPL both proximally and distally and transposed it out of its fibro-osseous tunnel.  I then opened the fourth compartment from the radial side and elevated it.  I retracted the tendons to either side of the wrist.  I identified the PIN in the floor of the fourth compartment and I excised a 2 cm section of the nerve using bipolar cautery to aid in pain relief after the procedure.  I encountered the wrist capsule.  The capsule was thickened.  I performed a ligament sparing capsulotomy.  There was a significant amount of inflammatory appearing joint fluid inside the joint.  I debrided some synovitis on the underside of the capsule.  I then encountered the lunate which was significantly collapsed and fragmented into multiple fragments.  I began with the carpectomy.  I started with the scaphoid.  I used a McGlamry retractor to carefully dissect the soft tissues off of the scaphoid.  I made sure to preserve the volar radiocarpal ligaments.  I remove the entire scaphoid.  I then removed the lunate using a combination of a McGlamry and a rongeur.  Finally, I removed the triquetrum also using a combination of the McGlamry and rongeur.  Throughout the process, I made sure to preserve the volar radiocarpal ligaments and was able to visualize them and confirmed that they were intact.  The proximal pole of the capitate as well as the distal radius had good quality cartilage without any notable defects.  I took the wrist through range of motion, and felt that performing a radial styloidectomy would be beneficial to prevent impingement in this case.  I sharply subperiosteally elevated over the distal radius, taking care to protect the first extensor compartment tendons.   I then used an osteotome and a mallet to remove a small portion of the radial styloid.  I then took the wrist through range of motion again, and felt that this had removed the possibility of impingement.  The wound was thoroughly irrigated with normal saline.  I closed the capsule in a pants over vest fashion to tighten it just a bit, using 4-0 Vicryl.  I then used fluoroscopy to confirm that the carpectomy was successful and that the capitate was well-positioned in the lunate facet.  I then closed the extensor retinaculum using a 4-0 Vicryl, and during this process I kept the EPL tendon transposed as it would have been too tight to close the inside of its tunnel.  I then closed the skin with 4-0 nylon.  Sterile dressings and a volar resting splint were applied.  The patient awoke from anesthesia and was transferred to the PACU in stable condition.

## 2020-12-22 NOTE — DISCHARGE INSTRUCTIONS
ACTIVITY: Rest and take it easy for the first 24 hours.  A responsible adult is recommended to remain with you during that time.  It is normal to feel sleepy.  We encourage you to not do anything that requires balance, judgment or coordination.    MILD FLU-LIKE SYMPTOMS ARE NORMAL. YOU MAY EXPERIENCE GENERALIZED MUSCLE ACHES, THROAT IRRITATION, HEADACHE AND/OR SOME NAUSEA.    FOR 24 HOURS DO NOT:  Drive, operate machinery or run household appliances.  Drink beer or alcoholic beverages.   Make important decisions or sign legal documents.    SPECIAL INSTRUCTIONS: see above instructions by Dr. Almaraz    DIET: To avoid nausea, slowly advance diet as tolerated, avoiding spicy or greasy foods for the first day.  Add more substantial food to your diet according to your physician's instructions.  Babies can be fed formula or breast milk as soon as they are hungry.  INCREASE FLUIDS AND FIBER TO AVOID CONSTIPATION.    SURGICAL DRESSING/BATHING: see above instructions by Dr. Almaraz    FOLLOW-UP APPOINTMENT:  A follow-up appointment should be arranged with your doctor; call to schedule.    You should CALL YOUR PHYSICIAN if you develop:  Fever greater than 101 degrees F.  Pain not relieved by medication, or persistent nausea or vomiting.  Excessive bleeding (blood soaking through dressing) or unexpected drainage from the wound.  Extreme redness or swelling around the incision site, drainage of pus or foul smelling drainage.  Inability to urinate or empty your bladder within 8 hours.  Problems with breathing or chest pain.    You should call 911 if you develop problems with breathing or chest pain.  If you are unable to contact your doctor or surgical center, you should go to the nearest emergency room or urgent care center.  Physician's telephone #: [phone]    If any questions arise, call your doctor.  If your doctor is not available, please feel free to call the Surgical Center at [phone]. The Contact Center is  open Monday through Friday 7AM to 5PM and may speak to a nurse at [phone], or toll free at [phone].     A registered nurse may call you a few days after your surgery to see how you are doing after your procedure.    MEDICATIONS: Resume taking daily medication.  Take prescribed pain medication with food.  If no medication is prescribed, you may take non-aspirin pain medication if needed.  PAIN MEDICATION CAN BE VERY CONSTIPATING.  Take a stool softener or laxative such as senokot, pericolace, or milk of magnesia if needed.    Prescription given for oxycodone.  Last pain medication given at _____________.    If your physician has prescribed pain medication that includes Acetaminophen (Tylenol), do not take additional Acetaminophen (Tylenol) while taking the prescribed medication.    Depression / Suicide Risk    As you are discharged from this [facility] facility, it is important to learn how to keep safe from harming yourself.    Recognize the warning signs:  · Abrupt changes in personality, positive or negative- including increase in energy   · Giving away possessions  · Change in eating patterns- significant weight changes-  positive or negative  · Change in sleeping patterns- unable to sleep or sleeping all the time   · Unwillingness or inability to communicate  · Depression  · Unusual sadness, discouragement and loneliness  · Talk of wanting to die  · Neglect of personal appearance   · Rebelliousness- reckless behavior  · Withdrawal from people/activities they love  · Confusion- inability to concentrate     If you or a loved one observes any of these behaviors or has concerns about self-harm, here's what you can do:  · Talk about it- your feelings and reasons for harming yourself  · Remove any means that you might use to hurt yourself (examples: pills, rope, extension cords, firearm)  · Get professional help from the community (Mental Health, Substance Abuse, psychological counseling)  · Do not be  alone:Call your Safe Contact- someone whom you trust who will be there for you.  · Call your local CRISIS HOTLINE 261-4781 or 504-411-7874  · Call your local Children's Mobile Crisis Response Team [place] (981) 004-7512 or www.Basic6  · Call the toll free National Suicide Prevention Hotlines   · National Suicide Prevention Lifeline 282-043-QZPZ (7319)  · National [employer] Line Network 800-SUICIDE (681-7916)

## 2020-12-22 NOTE — ANESTHESIA PROCEDURE NOTES
Airway    Date/Time: 12/22/2020 1:01 PM  Performed by: Cassandra Del Cid M.D.  Authorized by: Cassandra Del Cid M.D.     Location:  OR  Urgency:  Elective  Difficult Airway: No    Indications for Airway Management:  Anesthesia      Spontaneous Ventilation: absent    Sedation Level:  Deep  Preoxygenated: Yes    Mask Difficulty Assessment:  0 - not attempted  Final Airway Type:  Supraglottic airway  Final Supraglottic Airway:  Standard LMA    SGA Size:  5  Number of Attempts at Approach:  1

## 2020-12-22 NOTE — OR NURSING
1402 pt arrived from OR. Mask on 6L. LR infusing. Incision made to right wrist and dressed with xeroform, 4x4, soft roll splint, and ace bandage. Dressings are CDI.    1425 pt sitting up in stretcher and tolerating PO fluids. States having some pain. IV pain meds given.    1435 PO pain meds given and tolerated well. D/c instructions given to ENDY Horn, on the phone. All questions answered. Ice placed on extremity.    1450 pt states readiness to go home. IV removed, tip intact. Pt able to get dressed without assistance. Pt ambulated with steady gate out to entrance with this RN.

## 2021-08-06 ENCOUNTER — TELEPHONE (OUTPATIENT)
Dept: CARDIOLOGY | Facility: MEDICAL CENTER | Age: 36
End: 2021-08-06

## 2021-08-06 NOTE — TELEPHONE ENCOUNTER
Called patient in regards to his appointment that is scheduled on 08/12/2021 with . Patient stated that he has not seen no other cardiologist other then us no labs no imaging done.

## 2021-08-12 ENCOUNTER — OFFICE VISIT (OUTPATIENT)
Dept: CARDIOLOGY | Facility: MEDICAL CENTER | Age: 36
End: 2021-08-12
Payer: COMMERCIAL

## 2021-08-12 VITALS
DIASTOLIC BLOOD PRESSURE: 80 MMHG | RESPIRATION RATE: 14 BRPM | HEIGHT: 63 IN | SYSTOLIC BLOOD PRESSURE: 146 MMHG | WEIGHT: 152.6 LBS | OXYGEN SATURATION: 95 % | BODY MASS INDEX: 27.04 KG/M2 | HEART RATE: 82 BPM

## 2021-08-12 DIAGNOSIS — I48.91 ATRIAL FIBRILLATION WITH TACHYCARDIC VENTRICULAR RATE (HCC): ICD-10-CM

## 2021-08-12 DIAGNOSIS — R00.2 PALPITATIONS: ICD-10-CM

## 2021-08-12 DIAGNOSIS — Z86.79 HISTORY OF ATRIAL FIBRILLATION: ICD-10-CM

## 2021-08-12 DIAGNOSIS — Z98.890 HISTORY OF CARDIAC RADIOFREQUENCY ABLATION: ICD-10-CM

## 2021-08-12 DIAGNOSIS — I10 ESSENTIAL HYPERTENSION: ICD-10-CM

## 2021-08-12 LAB — EKG IMPRESSION: NORMAL

## 2021-08-12 PROCEDURE — 93000 ELECTROCARDIOGRAM COMPLETE: CPT | Performed by: INTERNAL MEDICINE

## 2021-08-12 PROCEDURE — 99204 OFFICE O/P NEW MOD 45 MIN: CPT | Performed by: INTERNAL MEDICINE

## 2021-08-12 RX ORDER — PANTOPRAZOLE SODIUM 40 MG/1
40 TABLET, DELAYED RELEASE ORAL
COMMUNITY
Start: 2021-08-06 | End: 2022-07-20

## 2021-08-13 ASSESSMENT — ENCOUNTER SYMPTOMS
PALPITATIONS: 1
MUSCULOSKELETAL NEGATIVE: 1
WHEEZING: 0
HEMOPTYSIS: 0
CONSTITUTIONAL NEGATIVE: 1
EYES NEGATIVE: 1
COUGH: 0
WEAKNESS: 0
CHILLS: 0
LOSS OF CONSCIOUSNESS: 0
GASTROINTESTINAL NEGATIVE: 1
STRIDOR: 0
PND: 0
SPUTUM PRODUCTION: 0
RESPIRATORY NEGATIVE: 1
SORE THROAT: 0
BRUISES/BLEEDS EASILY: 0
DIZZINESS: 0
SHORTNESS OF BREATH: 0
ORTHOPNEA: 0
NEUROLOGICAL NEGATIVE: 1
FEVER: 0
CLAUDICATION: 0

## 2021-08-13 NOTE — PROGRESS NOTES
Chief Complaint   Patient presents with   • Atrial Fibrillation     F/V Dx: PAF (paroxysmal atrial fibrillation) (CMS-HCC) (HCC       Subjective     Elian Tristan Sanchez is a 35 y.o. male who presents today in consultation for palpitations tachycardia and chest pain.  He is a 35-year-old man with past medical history of atrial fibrillation status post ablations x2.  He gets a sharp stabbing pain in his mid chest lasting hours at a time not relieved by rest and not worsened by exertion.  He also has paroxysmal tachycardia the left stop and rest for a few minutes.  He does not drink smoke or do drugs.When he climbs stairs present one flight of stairs he will be unable to catch his breath    Past Medical History:   Diagnosis Date   • Arrhythmia     hx afib, ablation x2    • Breath shortness     with exertion only with A fib   • Cardiac rhythm disturbance    • Hemorrhagic disorder (HCC)     hx- nose bleeds, even before Xarelto   • Hypertension     hasn't been on meds x2 yrs, states he took himself off and has not seen a MD for 2 yrs    • Pneumonia 2016    post cardiac ablation   • Snoring      Past Surgical History:   Procedure Laterality Date   • WRIST ORIF Right 12/22/2020    Procedure: ORIF, WRIST - FOR PROXIMAL ROW CARPECTOMY;  Surgeon: Moise Almaraz M.D.;  Location: SURGERY SAME DAY [facility];  Service: Orthopedics   • RECOVERY  1/20/2016    Procedure: CATH LAB A-FIB ABLATION PATRICIA FARMER ;  Surgeon: Recoveryonly Surgery;  Location: SURGERY PRE-POST PROC UNIT [facility];  Service:    • OTHER CARDIAC SURGERY  01/16/2016    ablation   • OTHER ORTHOPEDIC SURGERY  1993    Pin place to hold joint in place.      History reviewed. No pertinent family history.  Social History     Socioeconomic History   • Marital status: Single     Spouse name: Not on file   • Number of children: Not on file   • Years of education: Not on file   • Highest education level: Not on file   Occupational History   • Not on file   Tobacco Use   •  Smoking status: Never Smoker   • Smokeless tobacco: Never Used   Vaping Use   • Vaping Use: Never used   Substance and Sexual Activity   • Alcohol use: Not Currently     Comment: occasional   • Drug use: Yes     Types: Inhaled     Comment: marijuana- occ   • Sexual activity: Not on file   Other Topics Concern   • Not on file   Social History Narrative   • Not on file     Social Determinants of Health     Financial Resource Strain:    • Difficulty of Paying Living Expenses:    Food Insecurity:    • Worried About Running Out of Food in the Last Year:    • Ran Out of Food in the Last Year:    Transportation Needs:    • Lack of Transportation (Medical):    • Lack of Transportation (Non-Medical):    Physical Activity:    • Days of Exercise per Week:    • Minutes of Exercise per Session:    Stress:    • Feeling of Stress :    Social Connections:    • Frequency of Communication with Friends and Family:    • Frequency of Social Gatherings with Friends and Family:    • Attends Taoist Services:    • Active Member of Clubs or Organizations:    • Attends Club or Organization Meetings:    • Marital Status:    Intimate Partner Violence:    • Fear of Current or Ex-Partner:    • Emotionally Abused:    • Physically Abused:    • Sexually Abused:      No Known Allergies  Outpatient Encounter Medications as of 8/12/2021   Medication Sig Dispense Refill   • pantoprazole (PROTONIX) 40 MG Tablet Delayed Response Take 40 mg by mouth.     • acetaminophen (TYLENOL) 500 MG Tab Take 500-1,000 mg by mouth every 6 hours as needed.     • ibuprofen (MOTRIN) 200 MG Tab Take 200 mg by mouth every 6 hours as needed.     • [DISCONTINUED] apixaban (ELIQUIS) 5mg Tab Take 1 Tab by mouth 2 Times a Day. (Patient not taking: Reported on 12/17/2020) 60 Tab 3   • [DISCONTINUED] losartan (COZAAR) 50 MG Tab Take 1 Tab by mouth every day. (Patient not taking: Reported on 12/17/2020) 30 Tab 3     No facility-administered encounter medications on file as of  "8/12/2021.     Review of Systems   Constitutional: Negative.  Negative for chills, fever and malaise/fatigue.   HENT: Negative.  Negative for sore throat.    Eyes: Negative.    Respiratory: Negative.  Negative for cough, hemoptysis, sputum production, shortness of breath, wheezing and stridor.    Cardiovascular: Positive for palpitations. Negative for chest pain, orthopnea, claudication, leg swelling and PND.   Gastrointestinal: Negative.    Genitourinary: Negative.    Musculoskeletal: Negative.    Skin: Negative.    Neurological: Negative.  Negative for dizziness, loss of consciousness and weakness.   Endo/Heme/Allergies: Negative.  Does not bruise/bleed easily.   All other systems reviewed and are negative.             Objective     /80 (BP Location: Left arm, Patient Position: Sitting, BP Cuff Size: Adult)   Pulse 82   Resp 14   Ht 1.6 m (5' 3\")   Wt 69.2 kg (152 lb 9.6 oz)   SpO2 95%   BMI 27.03 kg/m²     Physical Exam   Constitutional: He appears well-developed. No distress.   HENT:   Head: Normocephalic and atraumatic.   Right Ear: External ear normal.   Left Ear: External ear normal.   Nose: Nose normal.   Mouth/Throat: No oropharyngeal exudate.   Eyes: Pupils are equal, round, and reactive to light. Conjunctivae are normal. Right eye exhibits no discharge. Left eye exhibits no discharge. No scleral icterus.   Neck: No JVD present.   Cardiovascular: Normal rate and regular rhythm. Exam reveals no gallop and no friction rub.   No murmur heard.  Pulmonary/Chest: Effort normal. No stridor. No respiratory distress. He has no wheezes. He has no rales. He exhibits no tenderness.   Abdominal: Soft. He exhibits no distension. There is no guarding.   Musculoskeletal:         General: No tenderness or deformity. Normal range of motion.      Cervical back: Neck supple.   Neurological: He is alert. He has normal reflexes. He displays normal reflexes. No cranial nerve deficit. He exhibits normal muscle tone. " Coordination normal.   Skin: Skin is warm and dry. No rash noted. He is not diaphoretic. No erythema. No pallor.   Psychiatric: His behavior is normal. Judgment and thought content normal.   Nursing note and vitals reviewed.             Assessment & Plan     1. Atrial fibrillation with tachycardic ventricular rate (HCC)  EKG   2. Essential hypertension     3. History of cardiac radiofrequency ablation     4. History of atrial fibrillation     5. Palpitations  Cardiac Event Monitor       Medical Decision Making: Today's Assessment/Status/Plan:        35-year-old male with history of atrial fibrillation status post ablation x2 with recurrent tachycardia chest pain.  For his chest pain I feel it is noncardiac and requires no further work-up.  His ECG is normal today and reassuring.  For his tachycardia we will get an event recorder.  We will see him back with the results of this.

## 2021-08-26 ENCOUNTER — NON-PROVIDER VISIT (OUTPATIENT)
Dept: CARDIOLOGY | Facility: MEDICAL CENTER | Age: 36
End: 2021-08-26
Payer: COMMERCIAL

## 2021-08-26 ENCOUNTER — TELEPHONE (OUTPATIENT)
Dept: CARDIOLOGY | Facility: MEDICAL CENTER | Age: 36
End: 2021-08-26

## 2021-08-26 DIAGNOSIS — I49.3 PVC (PREMATURE VENTRICULAR CONTRACTION): ICD-10-CM

## 2021-08-26 DIAGNOSIS — Z86.79 ATRIAL FIBRILLATION, CURRENTLY IN SINUS RHYTHM: ICD-10-CM

## 2021-08-26 DIAGNOSIS — I49.1 PREMATURE ATRIAL CONTRACTION: ICD-10-CM

## 2021-08-26 NOTE — TELEPHONE ENCOUNTER
Patient enrolled in the 14 day ePatch Holter monitoring program per Lior Ford MD.  In office hookup.    >Currently pending EOS.

## 2021-09-17 DIAGNOSIS — R00.2 PALPITATIONS: ICD-10-CM

## 2021-09-17 PROCEDURE — 93228 REMOTE 30 DAY ECG REV/REPORT: CPT | Performed by: INTERNAL MEDICINE

## 2021-11-15 ENCOUNTER — TELEPHONE (OUTPATIENT)
Dept: CARDIOLOGY | Facility: MEDICAL CENTER | Age: 36
End: 2021-11-15

## 2021-11-15 NOTE — TELEPHONE ENCOUNTER
11/15/21 tried to call pt to r/s missed appt w/Dr. Ford from 11/12/21 but there was no answer and mailbox is full. vw

## 2022-07-20 ENCOUNTER — APPOINTMENT (OUTPATIENT)
Dept: RADIOLOGY | Facility: IMAGING CENTER | Age: 37
End: 2022-07-20
Payer: COMMERCIAL

## 2022-07-20 ENCOUNTER — OFFICE VISIT (OUTPATIENT)
Dept: URGENT CARE | Facility: PHYSICIAN GROUP | Age: 37
End: 2022-07-20
Payer: COMMERCIAL

## 2022-07-20 VITALS
BODY MASS INDEX: 23.05 KG/M2 | OXYGEN SATURATION: 97 % | DIASTOLIC BLOOD PRESSURE: 82 MMHG | RESPIRATION RATE: 18 BRPM | WEIGHT: 135 LBS | TEMPERATURE: 97 F | SYSTOLIC BLOOD PRESSURE: 142 MMHG | HEART RATE: 91 BPM | HEIGHT: 64 IN

## 2022-07-20 DIAGNOSIS — R03.0 ELEVATED BLOOD PRESSURE READING: ICD-10-CM

## 2022-07-20 DIAGNOSIS — M79.641 PAIN OF RIGHT HAND: ICD-10-CM

## 2022-07-20 DIAGNOSIS — M25.531 WRIST PAIN, ACUTE, RIGHT: ICD-10-CM

## 2022-07-20 PROCEDURE — 99214 OFFICE O/P EST MOD 30 MIN: CPT

## 2022-07-20 PROCEDURE — 73130 X-RAY EXAM OF HAND: CPT | Mod: TC,FY,RT | Performed by: RADIOLOGY

## 2022-07-20 NOTE — PROGRESS NOTES
"Subjective:   Elian Sanchez is a 36 y.o. male who presents for Hand Injury (Was drilling felt pain in left hand. Hx of hand surgery 3/2021)      HPI  This is a 36-year-old male who presents today with right hand and wrist pain x1 day.  Patient reports using power drill today, when the drill got caught resulting in outward twisting motion of the right hand.  Patient reports immediately experiencing pain to hand and wrist.  He describes discomfort as \"pulling sensation\" that radiates up to forearm.  Pain is exacerbated with opening and closing fist.  He reports he had surgical procedure to right hand and wrist last year at [facility].  He has not taken anything for pain.      ROS per HPI    Medications:    Current Outpatient Medications on File Prior to Visit   Medication Sig Dispense Refill   • acetaminophen (TYLENOL) 500 MG Tab Take 500-1,000 mg by mouth every 6 hours as needed.     • ibuprofen (MOTRIN) 200 MG Tab Take 200 mg by mouth every 6 hours as needed.     • pantoprazole (PROTONIX) 40 MG Tablet Delayed Response Take 40 mg by mouth. (Patient not taking: Reported on 7/20/2022)       No current facility-administered medications on file prior to visit.        Allergies:   Patient has no known allergies.    Problem List:   Patient Active Problem List   Diagnosis   • Atrial fibrillation with tachycardic ventricular rate (HCC)   • CAP (community acquired pneumonia)   • History of cardiac radiofrequency ablation   • Hypoxia   • History of atrial fibrillation   • HTN (hypertension)        Surgical History:  Past Surgical History:   Procedure Laterality Date   • ORIF, WRIST Right 12/22/2020    Procedure: ORIF, WRIST - FOR PROXIMAL ROW CARPECTOMY;  Surgeon: Moise Almaraz M.D.;  Location: SURGERY SAME DAY [facility];  Service: Orthopedics   • RECOVERY  1/20/2016    Procedure: CATH LAB A-FIB ABLATION HALLEG KARLIE FARMER ;  Surgeon: Recoveryon Surgery;  [place] [facility] [place]  Service:    • OTHER CARDIAC " "SURGERY  01/16/2016    ablation   • OTHER ORTHOPEDIC SURGERY  1993    Pin place to hold joint in place.        Past Social Hx:   Social History     Tobacco Use   • Smoking status: Never Smoker   • Smokeless tobacco: Never Used   Vaping Use   • Vaping Use: Never used   Substance Use Topics   • Alcohol use: Not Currently     Comment: occasional   • Drug use: Yes     Types: Inhaled     Comment: marijuana- occ          Problem list, medications, and allergies reviewed by myself today in Epic.     Objective:     BP (!) 142/82   Pulse 91   Temp 36.1 °C (97 °F) (Temporal)   Resp 18   Ht 1.626 m (5' 4\")   Wt 61.2 kg (135 lb)   SpO2 97%   BMI 23.17 kg/m²     Physical Exam  Vitals and nursing note reviewed.   Constitutional:       General: He is not in acute distress.     Appearance: Normal appearance. He is normal weight. He is not ill-appearing or toxic-appearing.   HENT:      Head: Normocephalic and atraumatic.   Cardiovascular:      Rate and Rhythm: Normal rate.   Musculoskeletal:      Right hand: Swelling present.        Arms:    Skin:     General: Skin is warm and dry.      Capillary Refill: Capillary refill takes less than 2 seconds.   Neurological:      General: No focal deficit present.      Mental Status: He is alert and oriented to person, place, and time. Mental status is at baseline.   Psychiatric:         Mood and Affect: Mood normal.         Behavior: Behavior normal.         Thought Content: Thought content normal.         Judgment: Judgment normal.         Assessment/Plan:     Diagnosis and associated orders:   1. Elevated blood pressure reading     2. Pain of right hand  DX-HAND 3+ RIGHT   3. Wrist pain, acute, right  CANCELED: DX-WRIST-COMPLETE 3+ RIGHT      Comments/MDM:   Pt is clinically stable at today's acute urgent care visit.  No acute distress noted. Appropriate for outpatient management at this time.     Acute problem  DX negative today for bony abnormality or dislocation.  Patient will be " given thumb spica wrist brace for comfort, referral will be placed for sports medicine for further evaluation and recheck. Advised patient to apply ice, use ibuprofen and tylenol as needed for pain. Patient has verbalized understanding.            • Discussed DDx, management options (risks,benefits, and alternatives to planned treatment), natural progression and supportive care.  Expressed understanding and the treatment plan was agreed upon. Questions were encouraged and answered   • Return to urgent care prn if new or worsening sx or if there is no improvement in condition prn.    • Educated in Red flags and indications to immediately call 911 or present to the Emergency Department.   • Advised the patient to follow-up with the primary care physician for recheck, reevaluation, and consideration of further management.    I personally reviewed prior external notes and test results pertinent to today's visit.  I have independently reviewed and interpreted all diagnostics ordered during this urgent care acute visit.       Please note that this dictation was created using voice recognition software. I have made a reasonable attempt to correct obvious errors, but I expect that there are errors of grammar and possibly content that I did not discover before finalizing the note.    This note was electronically signed by JOSEPH Lawton

## 2022-07-20 NOTE — LETTER
July 20, 2022    To Whom It May Concern:         This is confirmation that Elian Sanchez attended his scheduled appointment with GLENDA Page on 7/20/22. He may return to work at this time with brace in place for comfort.          If you have any questions please do not hesitate to call me at the phone number listed below.    Sincerely,        GLENDA Page   Electronically signed   [phone]

## 2022-10-15 ENCOUNTER — HOSPITAL ENCOUNTER (OUTPATIENT)
Dept: RADIOLOGY | Facility: MEDICAL CENTER | Age: 37
End: 2022-10-15
Attending: PHYSICIAN ASSISTANT
Payer: COMMERCIAL

## 2022-10-15 ENCOUNTER — OFFICE VISIT (OUTPATIENT)
Dept: URGENT CARE | Facility: PHYSICIAN GROUP | Age: 37
End: 2022-10-15
Payer: COMMERCIAL

## 2022-10-15 VITALS
OXYGEN SATURATION: 99 % | SYSTOLIC BLOOD PRESSURE: 122 MMHG | WEIGHT: 135 LBS | HEIGHT: 64 IN | DIASTOLIC BLOOD PRESSURE: 84 MMHG | RESPIRATION RATE: 16 BRPM | HEART RATE: 86 BPM | BODY MASS INDEX: 23.05 KG/M2 | TEMPERATURE: 99.2 F

## 2022-10-15 DIAGNOSIS — R07.81 RIB PAIN ON LEFT SIDE: ICD-10-CM

## 2022-10-15 PROCEDURE — 71101 X-RAY EXAM UNILAT RIBS/CHEST: CPT | Mod: LT

## 2022-10-15 PROCEDURE — 99213 OFFICE O/P EST LOW 20 MIN: CPT | Performed by: PHYSICIAN ASSISTANT

## 2022-10-15 ASSESSMENT — PAIN SCALES - GENERAL: PAINLEVEL: 9=SEVERE PAIN

## 2022-10-15 ASSESSMENT — ENCOUNTER SYMPTOMS
SHORTNESS OF BREATH: 0
BACK PAIN: 1
PALPITATIONS: 0

## 2022-10-15 NOTE — PROGRESS NOTES
Subjective:   Elian Sanchez is a 37 y.o. male who presents today with   Chief Complaint   Patient presents with    Rib Injury     Possible Cracked (L) side rib, pain x2 days, sharp stabbing pain mid/lower back both sides      Rib Injury  This is a new problem. Episode onset: 4 days. The problem occurs constantly. Pertinent negatives include no chest pain. Exacerbated by: Movement. He has tried NSAIDs for the symptoms. The treatment provided mild relief.    Patient states he was attempting to brace himself and pull something heavy and he states that he slipped and hit into his left rib cage.  He states that the left ribs were initially hurting starting Tuesday and he noticed some back pain starting Thursday as well.  Patient does not want to file this as a work related injury.    PMH:  has a past medical history of Arrhythmia, Breath shortness, Cardiac rhythm disturbance, Hemorrhagic disorder (HCC), Hypertension, Pneumonia (2016), and Snoring.  MEDS:   Current Outpatient Medications:     acetaminophen (TYLENOL) 500 MG Tab, Take 500-1,000 mg by mouth every 6 hours as needed., Disp: , Rfl:     ibuprofen (MOTRIN) 200 MG Tab, Take 200 mg by mouth every 6 hours as needed., Disp: , Rfl:   ALLERGIES: No Known Allergies  SURGHX:   Past Surgical History:   Procedure Laterality Date    ORIF, WRIST Right 12/22/2020    Procedure: ORIF, WRIST - FOR PROXIMAL ROW CARPECTOMY;  Surgeon: Moise Almaraz M.D.;  Location: SURGERY SAME DAY [facility];  Service: Orthopedics    RECOVERY  1/20/2016    Procedure: CATH LAB A-FIB ABLATION PATRICIA FARMER ;  Surgeon: Recoveryon Surgery;  Location: SURGERY PRE-POST PROC UNIT [facility] [place];  Service:     OTHER CARDIAC SURGERY  01/16/2016    ablation    OTHER ORTHOPEDIC SURGERY  1993    Pin place to hold joint in place.      SOCHX:  reports that he has never smoked. He has never used smokeless tobacco. He reports that he does not currently use alcohol. He reports current drug use. Drug:  "Inhaled.  FH: Reviewed with patient, not pertinent to this visit.     Review of Systems   Respiratory:  Negative for shortness of breath.    Cardiovascular:  Negative for chest pain and palpitations.   Musculoskeletal:  Positive for back pain.        Left rib pain      Objective:   /84   Pulse 86   Temp 37.3 °C (99.2 °F) (Temporal)   Resp 16   Ht 1.626 m (5' 4\")   Wt 61.2 kg (135 lb)   SpO2 99%   BMI 23.17 kg/m²   Physical Exam  Vitals and nursing note reviewed.   Constitutional:       General: He is not in acute distress.     Appearance: Normal appearance. He is well-developed. He is not ill-appearing or toxic-appearing.   HENT:      Head: Normocephalic and atraumatic.      Right Ear: Hearing normal.      Left Ear: Hearing normal.   Cardiovascular:      Rate and Rhythm: Normal rate and regular rhythm.      Heart sounds: Normal heart sounds.   Pulmonary:      Effort: Pulmonary effort is normal.      Breath sounds: Normal breath sounds. No stridor. No wheezing, rhonchi or rales.   Chest:      Chest wall: No deformity, swelling or crepitus.   Breasts:     Left: No swelling.          Comments: Tenderness to palpation to the left lower rib cage.  Very minimal bruising to the area.  Musculoskeletal:        Back:       Comments: Normal movement in all 4 extremities.  Patient does have some mild paraspinous tenderness to the thoracic area.   Skin:     General: Skin is warm and dry.   Neurological:      Mental Status: He is alert.      Coordination: Coordination normal.   Psychiatric:         Mood and Affect: Mood normal.     DX RIBS  FINDINGS:  Cardiomediastinal silhouette is normal.     No focal consolidation, pleural effusion, pulmonary edema or pneumothorax.     No displaced rib fracture is seen. Nondisplaced rib fractures could be present and not visualized.     IMPRESSION:     1.  No acute cardiopulmonary abnormality.  2.  No displaced left rib fracture.    Assessment/Plan:   Assessment    1. Rib pain on " left side  - [id number] (WITH 1-VIEW CXR) LEFT; Future  Symptoms and presentation are consistent with contusion of the left rib cage.  Patient likely having some back pain from compensating secondary to the pain in his ribs.  Recommend use of rest, topical lidocaine patches over-the-counter continued use of ibuprofen per 's instructions.  Offered and recommended for patient to file this as work-related injury but he declines.  Differential diagnosis, natural history, supportive care, and indications for immediate follow-up discussed.   Patient given instructions and understanding of medications and treatment.    If not improving in 3-5 days, F/U with PCP or return to  if symptoms worsen.    Patient agreeable to plan.    Please note that this dictation was created using voice recognition software. I have made every reasonable attempt to correct obvious errors, but I expect that there are errors of grammar and possibly content that I did not discover before finalizing the note.    Good Yousif PA-C

## 2023-02-27 ENCOUNTER — OFFICE VISIT (OUTPATIENT)
Dept: URGENT CARE | Facility: CLINIC | Age: 38
End: 2023-02-27
Payer: COMMERCIAL

## 2023-02-27 VITALS
OXYGEN SATURATION: 97 % | DIASTOLIC BLOOD PRESSURE: 90 MMHG | WEIGHT: 149.91 LBS | HEART RATE: 90 BPM | SYSTOLIC BLOOD PRESSURE: 150 MMHG | BODY MASS INDEX: 25.59 KG/M2 | TEMPERATURE: 98.6 F | HEIGHT: 64 IN | RESPIRATION RATE: 14 BRPM

## 2023-02-27 DIAGNOSIS — K04.7 DENTAL ABSCESS: ICD-10-CM

## 2023-02-27 PROCEDURE — 99213 OFFICE O/P EST LOW 20 MIN: CPT | Performed by: PHYSICIAN ASSISTANT

## 2023-02-27 RX ORDER — AMOXICILLIN 500 MG/1
500 CAPSULE ORAL 3 TIMES DAILY
Qty: 21 CAPSULE | Refills: 0 | Status: SHIPPED | OUTPATIENT
Start: 2023-02-27 | End: 2023-03-06

## 2023-02-27 ASSESSMENT — ENCOUNTER SYMPTOMS
VOMITING: 0
STRIDOR: 0
CHILLS: 0
NAUSEA: 0
FEVER: 0
HEADACHES: 0
NECK PAIN: 0
DIZZINESS: 0

## 2023-02-27 NOTE — PROGRESS NOTES
Subjective     Elian Sanchez is a 37 y.o. male who presents with Oral Pain (Possible tooth infection. )    HPI:  Elian Sanchez is a 37 y.o. male who presents today for evaluation of a possible tooth infection.  Patient reports that he broke his right posterior lower molar sometime ago but he did seem to have cracked a little bit more last night and is very painful and he has noticed some swelling on the inside of his mouth.  He contacted his dentist and has an appointment scheduled them for Friday but they recommend that he come to the urgent care to be placed on antibiotics prior to that appointment.  He has not had any fever/chills or trismus/drooling.        Review of Systems   Constitutional:  Negative for chills and fever.   HENT:          Dental pain   Respiratory:  Negative for stridor.    Cardiovascular:  Negative for chest pain.   Gastrointestinal:  Negative for nausea and vomiting.   Musculoskeletal:  Negative for neck pain.   Neurological:  Negative for dizziness and headaches.       PMH:  has a past medical history of Arrhythmia, Breath shortness, Cardiac rhythm disturbance, Hemorrhagic disorder (HCC), Hypertension, Pneumonia (2016), and Snoring.  MEDS:   Current Outpatient Medications:     acetaminophen (TYLENOL) 500 MG Tab, Take 500-1,000 mg by mouth every 6 hours as needed., Disp: , Rfl:     ibuprofen (MOTRIN) 200 MG Tab, Take 200 mg by mouth every 6 hours as needed., Disp: , Rfl:   ALLERGIES: No Known Allergies  SURGHX:   Past Surgical History:   Procedure Laterality Date    ORIF, WRIST Right 12/22/2020    Procedure: ORIF, WRIST - FOR PROXIMAL ROW CARPECTOMY;  Surgeon: Moise Almaraz M.D.;  Location: SURGERY SAME DAY [facility];  Service: Orthopedics    RECOVERY  1/20/2016    Procedure: CATH LAB A-FIB ABLATION LRG KARLIE FARMER ;  Surgeon: Recoveryonly Surgery;  Location: SURGERY PRE-POST PROC UNIT [facility] [place];  Service:     OTHER CARDIAC SURGERY  01/16/2016    ablation    OTHER ORTHOPEDIC SURGERY  " 1993    Pin place to hold joint in place.      SOCHX:  reports that he has never smoked. He has never used smokeless tobacco. He reports that he does not currently use alcohol. He reports current drug use. Drug: Inhaled.  FH: Family history was reviewed, no pertinent findings to report      Objective     BP (!) 150/90   Pulse 90   Temp 37 °C (98.6 °F) (Temporal)   Resp 14   Ht 1.626 m (5' 4\")   Wt 68 kg (149 lb 14.6 oz)   SpO2 97%   BMI 25.73 kg/m²      Physical Exam  Constitutional:       General: He is not in acute distress.     Appearance: He is not diaphoretic.   HENT:      Head: Normocephalic and atraumatic.      Jaw: No trismus.      Right Ear: External ear normal.      Left Ear: External ear normal.      Mouth/Throat:      Dentition: Abnormal dentition. Gingival swelling and dental abscesses present.      Comments: Right posterior lower molar is broken and there is surrounding gingival swelling and overlying erythema with developing abscess noted.  There is also some trace swelling in his right cheek with tenderness  Eyes:      Conjunctiva/sclera: Conjunctivae normal.      Pupils: Pupils are equal, round, and reactive to light.   Pulmonary:      Effort: Pulmonary effort is normal. No respiratory distress.   Musculoskeletal:      Cervical back: Normal range of motion. No edema or erythema. Normal range of motion.   Lymphadenopathy:      Cervical: Cervical adenopathy present.      Right cervical: Superficial cervical adenopathy present.   Skin:     Findings: No rash.   Neurological:      Mental Status: He is alert and oriented to person, place, and time.   Psychiatric:         Mood and Affect: Mood and affect normal.         Cognition and Memory: Memory normal.         Judgment: Judgment normal.           Assessment & Plan     1. Dental abscess  - amoxicillin (AMOXIL) 500 MG Cap; Take 1 Capsule by mouth 3 times a day for 7 days.  Dispense: 21 Capsule; Refill: 0  -OTC analgesics as needed for " pain  -Recommend rinses with warm salt water and continue brushing teeth with a soft result toothbrush  -Also recommend more of a soft food/liquid diet.  If he does chew he should chew on the left side of his mouth  -He may apply ice packs to the outside of his face.  He may also try using OTC Orajel for pain relief.  -Follow-up with dentist on Friday as scheduled

## 2023-02-27 NOTE — LETTER
February 27, 2023         Patient: Elian Sanchez   YOB: 1985   Date of Visit: 2/27/2023           To Whom it May Concern:    Elian Sanchez was seen in my clinic on 2/27/2023.             Sincerely,           Mica Garduno P.A.-C.  Electronically Signed

## 2023-04-12 ENCOUNTER — TELEPHONE (OUTPATIENT)
Dept: HEALTH INFORMATION MANAGEMENT | Facility: OTHER | Age: 38
End: 2023-04-12
Payer: COMMERCIAL

## 2023-06-15 ENCOUNTER — APPOINTMENT (OUTPATIENT)
Dept: RADIOLOGY | Facility: IMAGING CENTER | Age: 38
End: 2023-06-15
Attending: PHYSICIAN ASSISTANT
Payer: COMMERCIAL

## 2023-06-15 ENCOUNTER — OFFICE VISIT (OUTPATIENT)
Dept: URGENT CARE | Facility: CLINIC | Age: 38
End: 2023-06-15
Payer: COMMERCIAL

## 2023-06-15 VITALS
BODY MASS INDEX: 23.32 KG/M2 | OXYGEN SATURATION: 98 % | SYSTOLIC BLOOD PRESSURE: 142 MMHG | HEART RATE: 75 BPM | WEIGHT: 140 LBS | DIASTOLIC BLOOD PRESSURE: 92 MMHG | HEIGHT: 65 IN | TEMPERATURE: 99.8 F

## 2023-06-15 DIAGNOSIS — S97.01XA CRUSHING INJURY OF RIGHT FOOT AND ANKLE, INITIAL ENCOUNTER: ICD-10-CM

## 2023-06-15 DIAGNOSIS — S97.81XA CRUSHING INJURY OF RIGHT FOOT AND ANKLE, INITIAL ENCOUNTER: ICD-10-CM

## 2023-06-15 PROCEDURE — 3080F DIAST BP >= 90 MM HG: CPT | Performed by: PHYSICIAN ASSISTANT

## 2023-06-15 PROCEDURE — 73610 X-RAY EXAM OF ANKLE: CPT | Mod: TC,RT | Performed by: PHYSICIAN ASSISTANT

## 2023-06-15 PROCEDURE — 99214 OFFICE O/P EST MOD 30 MIN: CPT | Performed by: PHYSICIAN ASSISTANT

## 2023-06-15 PROCEDURE — 73630 X-RAY EXAM OF FOOT: CPT | Mod: TC,RT | Performed by: PHYSICIAN ASSISTANT

## 2023-06-15 PROCEDURE — 3077F SYST BP >= 140 MM HG: CPT | Performed by: PHYSICIAN ASSISTANT

## 2023-06-15 ASSESSMENT — ENCOUNTER SYMPTOMS
TINGLING: 0
FEVER: 0
CHILLS: 0
BRUISES/BLEEDS EASILY: 0
SENSORY CHANGE: 1
NAUSEA: 0
FOCAL WEAKNESS: 0
VOMITING: 0

## 2023-06-15 NOTE — LETTER
Zee 15, 2023         Patient: Elian Sanchez   YOB: 1985   Date of Visit: 6/15/2023           To Whom it May Concern:    Elian Sanchez was seen in my clinic on 6/15/2023.  He is being treated for an injury to his right foot/ankle.  He will likely need to ambulate with crutches for at least 1 week.  He should only be doing seated/sedentary activities until he can follow-up with orthopedics.  Thank you for making appropriate accommodations as he recovers.      Sincerely,           Mica Garduno P.A.-C.  Electronically Signed

## 2023-06-15 NOTE — PROGRESS NOTES
"Subjective     Elian Sanchez is a 37 y.o. male who presents with Foot Injury (Horse stepped on his foot 2 hours ago)    HPI:  Elian Sanchez is a 37 y.o. male who presents today for evaluation of an injury to his right foot.  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  Says he was wearing foot flops.  One of the horses got spooked and stomped on his right foot/ankle to push off and start running away.  Patient notes that he attempted to clean it but has been unwilling to bear weight.  He says that every time he steps down he feels as though the bottom of his foot is \"ripping\".  He feels as though the top of his foot and ankle are numb but can still feel and move his toes.  No previous injury.        Review of Systems   Constitutional:  Negative for chills and fever.   Gastrointestinal:  Negative for nausea and vomiting.   Musculoskeletal:         Injury of right foot/ankle   Neurological:  Positive for sensory change. Negative for tingling and focal weakness.   Endo/Heme/Allergies:  Does not bruise/bleed easily.         PMH:  has a past medical history of Arrhythmia, Breath shortness, Cardiac rhythm disturbance, Hemorrhagic disorder (HCC), Hypertension, Pneumonia (2016), and Snoring.  MEDS:   Current Outpatient Medications:     acetaminophen (TYLENOL) 500 MG Tab, Take 500-1,000 mg by mouth every 6 hours as needed. (Patient not taking: Reported on 6/15/2023), Disp: , Rfl:     ibuprofen (MOTRIN) 200 MG Tab, Take 200 mg by mouth every 6 hours as needed. (Patient not taking: Reported on 6/15/2023), Disp: , Rfl:   ALLERGIES: No Known Allergies  SURGHX:   Past Surgical History:   Procedure Laterality Date    ORIF, WRIST Right 12/22/2020    Procedure: ORIF, WRIST - FOR PROXIMAL ROW CARPECTOMY;  Surgeon: Moise Almaraz M.D.;  Location: SURGERY SAME DAY [facility];  Service: Orthopedics    RECOVERY  1/20/2016    Procedure: CATH LAB A-FIB ABLATION LRG KARLIE FARMER ;  Surgeon: Andreas" "Surgery;  Location: SURGERY PRE-POST PROC UNIT [facility] [place];  Service:     OTHER CARDIAC SURGERY  01/16/2016    ablation    OTHER ORTHOPEDIC SURGERY  1993    Pin place to hold joint in place.      SOCHX:  reports that he has never smoked. He has never used smokeless tobacco. He reports that he does not currently use alcohol. He reports current drug use. Drug: Inhaled.  FH: Family history was reviewed, no pertinent findings to report      Objective     BP (!) 142/92   Pulse 75   Temp 37.7 °C (99.8 °F) (Temporal)   Ht 1.651 m (5' 5\") Comment: per pt  Wt 63.5 kg (140 lb) Comment: per pt  SpO2 98%   BMI 23.30 kg/m²      Physical Exam  Constitutional:       Appearance: He is well-developed.   HENT:      Head: Normocephalic and atraumatic.      Right Ear: External ear normal.      Left Ear: External ear normal.   Eyes:      Conjunctiva/sclera: Conjunctivae normal.      Pupils: Pupils are equal, round, and reactive to light.   Cardiovascular:      Rate and Rhythm: Normal rate and regular rhythm.      Heart sounds: Normal heart sounds. No murmur heard.  Pulmonary:      Effort: Pulmonary effort is normal.      Breath sounds: Normal breath sounds. No wheezing.   Musculoskeletal:      Right foot: Decreased range of motion. Swelling, tenderness and bony tenderness present.      Comments: Right foot: Superficial abrasion/laceration noted to dorsum of right foot with dried blood.  There is diffuse tenderness on the dorsal aspect of the right foot most notable over the first through third metatarsals and carpal bones.  There is also some surrounding ecchymosis and soft tissue swelling.  Tenderness, bruising, and abrasion extending to the anterior aspect of right ankle.  He has very mildly decreased range of motion of the right ankle secondary to pain in the foot.  DP pulses are 2+.. Distal neurovascular status intact.  Cap refill of all toes less than 2 seconds.  Patient not willing to bear weight.   Skin:     General: Skin is warm " and dry.      Capillary Refill: Capillary refill takes less than 2 seconds.   Neurological:      Mental Status: He is alert and oriented to person, place, and time.   Psychiatric:         Behavior: Behavior normal.         Judgment: Judgment normal.       DX-FOOT-COMPLETE 3+ RIGHT  IMPRESSION:  No acute fracture or dislocation is noted.    DX-ANKLE 3+ VIEWS RIGHT  FINDINGS:  Bone mineralization is age appropriate. Bony alignment is anatomic. There is no evidence of acute fracture or dislocation. The ankle mortise is symmetric.     IMPRESSION:  No radiographic evidence of acute traumatic injury.    *X-rays were reviewed and interpreted independently by me. I agree with the radiologist's findings     Assessment & Plan     1. Crushing injury of right foot and ankle, initial encounter  - DX-FOOT-COMPLETE 3+ RIGHT; Future  - DX-ANKLE 3+ VIEWS RIGHT; Future  - Referral to Orthopedics      Patient with crushing injury/mild abrasion/laceration after a horse stepped on his foot a few hours ago.  X-rays of the right foot/ankle are negative for any acute fractures or dislocations.  He is neurovascularly intact.  Abrasions cleaned with chlorhexidine and irrigated with sterile saline.  Dressing of nonstick gauze was then applied and then Ace wrap applied to the foot/ankle.  Patient provided with crutches.  He was advised to be completely nonweightbearing for the next few days that he can be weightbearing as tolerated.  RICE protocols and use of OTC NSAIDs/analgesics discussed.  Note also given for work.  I have also placed a referral for patient to follow-up with orthopedics for more definitive management and evaluation given the mechanism of injury.              Differential Diagnosis, natural history, and supportive care discussed. Return to the Urgent Care or follow up with your PCP if symptoms fail to resolve, or for any new or worsening symptoms. Emergency room precautions discussed. Patient and/or family appears  understanding of information.

## (undated) DEVICE — GLOVE BIOGEL INDICATOR SZ 8 SURGICAL PF LTX - (50/BX 4BX/CA)

## (undated) DEVICE — SODIUM CHL IRRIGATION 0.9% 1000ML (12EA/CA)

## (undated) DEVICE — HEAD HOLDER JUNIOR/ADULT

## (undated) DEVICE — MASK ANESTHESIA ADULT  - (100/CA)

## (undated) DEVICE — SUTURE GENERAL

## (undated) DEVICE — TOURNIQUET, STERILE 18 (RED)

## (undated) DEVICE — SLEEVE VASO CALF MED - (10PR/CA)

## (undated) DEVICE — KIT ANESTHESIA W/CIRCUIT & 3/LT BAG W/FILTER (20EA/CA)

## (undated) DEVICE — PROTECTOR ULNA NERVE - (36PR/CA)

## (undated) DEVICE — SPLINT PLASTER 4 IN  X 15 IN - (50/BX 12BX/CA)

## (undated) DEVICE — PACK LOWER EXTREMITY - (2/CA)

## (undated) DEVICE — GLOVE BIOGEL SZ 7.5 SURGICAL PF LTX - (50PR/BX 4BX/CA)

## (undated) DEVICE — STOCKINET BIAS 4 IN STERILE - (20/CA)

## (undated) DEVICE — TUBING CLEARLINK DUO-VENT - C-FLO (48EA/CA)

## (undated) DEVICE — MASK, LARYNGEAL AIRWAY #4

## (undated) DEVICE — ELECTRODE 850 FOAM ADHESIVE - HYDROGEL RADIOTRNSPRNT (50/PK)

## (undated) DEVICE — DRAPE 36X28IN RAD CARM BND BG - (25/CA) O

## (undated) DEVICE — PADDING CAST 4 IN STERILE - 4 X 4 YDS (24/CA)

## (undated) DEVICE — SET LEADWIRE 5 LEAD BEDSIDE DISPOSABLE ECG (1SET OF 5/EA)

## (undated) DEVICE — CANISTER SUCTION 3000ML MECHANICAL FILTER AUTO SHUTOFF MEDI-VAC NONSTERILE LF DISP  (40EA/CA)

## (undated) DEVICE — GOWN WARMING STANDARD FLEX - (30/CA)

## (undated) DEVICE — SUTURE 2-0 MONOCRYL CT-1

## (undated) DEVICE — LACTATED RINGERS INJ 1000 ML - (14EA/CA 60CA/PF)

## (undated) DEVICE — SET EXTENSION WITH 2 PORTS (48EA/CA) ***PART #2C8610 IS A SUBSTITUTE*****

## (undated) DEVICE — SENSOR SPO2 NEO LNCS ADHESIVE (20/BX) SEE USER NOTES

## (undated) DEVICE — SUCTION INSTRUMENT YANKAUER BULBOUS TIP W/O VENT (50EA/CA)

## (undated) DEVICE — SUTURE 4-0 ETHILON FS-1 18 (36PK/BX)"